# Patient Record
Sex: MALE | Race: WHITE | Employment: FULL TIME | URBAN - METROPOLITAN AREA
[De-identification: names, ages, dates, MRNs, and addresses within clinical notes are randomized per-mention and may not be internally consistent; named-entity substitution may affect disease eponyms.]

---

## 2017-03-10 RX ORDER — ATORVASTATIN CALCIUM 20 MG/1
TABLET, FILM COATED ORAL
Qty: 90 TABLET | OUTPATIENT
Start: 2017-03-10

## 2017-03-27 NOTE — TELEPHONE ENCOUNTER
Refilled denied   Patient needs to be seen. Left message stating patient needs to be seen for med check.    Last OV pertinent to medication: 10/1/15   Last refill date: 12/9/16     #/refills: 90 tabs + 0 refills   When pt was asked to return for OV: NO ment

## 2017-07-18 ENCOUNTER — OFFICE VISIT (OUTPATIENT)
Dept: INTERNAL MEDICINE CLINIC | Facility: CLINIC | Age: 52
End: 2017-07-18

## 2017-07-18 ENCOUNTER — TELEPHONE (OUTPATIENT)
Dept: INTERNAL MEDICINE CLINIC | Facility: CLINIC | Age: 52
End: 2017-07-18

## 2017-07-18 VITALS
HEIGHT: 69 IN | OXYGEN SATURATION: 98 % | DIASTOLIC BLOOD PRESSURE: 80 MMHG | SYSTOLIC BLOOD PRESSURE: 120 MMHG | BODY MASS INDEX: 28.73 KG/M2 | WEIGHT: 194 LBS | HEART RATE: 71 BPM | RESPIRATION RATE: 20 BRPM

## 2017-07-18 DIAGNOSIS — N28.1 BILATERAL RENAL CYSTS: Primary | ICD-10-CM

## 2017-07-18 DIAGNOSIS — M54.31 SCIATICA OF RIGHT SIDE: ICD-10-CM

## 2017-07-18 PROCEDURE — 99214 OFFICE O/P EST MOD 30 MIN: CPT | Performed by: INTERNAL MEDICINE

## 2017-07-18 NOTE — PROGRESS NOTES
Jad Sprain is a 54year old male.   HPI:   CC: abnormal finding on  MRI spine ordered by chiropractor 7/3/17 for radiating pain down right leg  Pt seeing chiropractor for 6 months got some relief only  Pt saw pain specialist Dr Hector Regalado recently who recomme 20   Ht 69\"   Wt 194 lb   SpO2 98%   BMI 28.65 kg/m²   GENERAL: well developed, well nourished,in no apparent distressy  CARDIO: [id number] no S3S4 no murmur,   LUNGS: clear to auscultation  GI: Soft+BS NT ND,no masses, no HSM, no abdominal bruit , no hernia

## 2017-07-18 NOTE — TELEPHONE ENCOUNTER
Called patient asking where he had his recent MRI done. Patient has an appointment with Dr Eileen Flowers at 1:20. Need to get a copy of this before his appointment.

## 2017-07-21 ENCOUNTER — HOSPITAL ENCOUNTER (OUTPATIENT)
Dept: ULTRASOUND IMAGING | Age: 52
Discharge: HOME | End: 2017-07-21
Attending: INTERNAL MEDICINE
Payer: COMMERCIAL

## 2017-07-21 DIAGNOSIS — N28.1 BILATERAL RENAL CYSTS: ICD-10-CM

## 2017-07-21 PROCEDURE — 76775 US EXAM ABDO BACK WALL LIM: CPT | Performed by: INTERNAL MEDICINE

## 2017-07-24 NOTE — PROGRESS NOTES
Spoke to pt, aware of results and recommendations. Pt voiced understanding. Gave Dr Leroy Osuna information.

## 2017-08-25 PROBLEM — N40.1 BENIGN PROSTATIC HYPERPLASIA WITH LOWER URINARY TRACT SYMPTOMS, SYMPTOM DETAILS UNSPECIFIED: Status: ACTIVE | Noted: 2017-08-25

## 2017-09-01 PROCEDURE — 36415 COLL VENOUS BLD VENIPUNCTURE: CPT | Performed by: INTERNAL MEDICINE

## 2017-09-01 PROCEDURE — 84153 ASSAY OF PSA TOTAL: CPT | Performed by: INTERNAL MEDICINE

## 2018-02-12 NOTE — TELEPHONE ENCOUNTER
Pt is leaving country and would like a 90 day supply of Atorvastatin to be sent to [facility] on file. Please advise.

## 2018-02-13 RX ORDER — ATORVASTATIN CALCIUM 20 MG/1
20 TABLET, FILM COATED ORAL
Qty: 90 TABLET | Refills: 0 | Status: SHIPPED | OUTPATIENT
Start: 2018-02-13 | End: 2018-04-12 | Stop reason: DRUGHIGH

## 2018-03-26 ENCOUNTER — TELEPHONE (OUTPATIENT)
Dept: INTERNAL MEDICINE CLINIC | Facility: CLINIC | Age: 53
End: 2018-03-26

## 2018-03-26 DIAGNOSIS — E78.5 HYPERLIPIDEMIA, UNSPECIFIED HYPERLIPIDEMIA TYPE: Primary | ICD-10-CM

## 2018-03-26 NOTE — TELEPHONE ENCOUNTER
Patient called to schedule a physical (patient was receiving a lot of phytel calls.) Patient scheduled his yearly physical for 4/12/18, patient asked if we can order blood work labs to have done before the physical. Advised to patient we don't usually orde

## 2018-03-26 NOTE — TELEPHONE ENCOUNTER
PSA, LIPID, CMP done 9/2017 - elevated total cholesterol and LDL. Pt asking for exception and having any due labs done before Physical 4/12/18. PSR advised already that Drs prefer to wait until OV. Please advise.

## 2018-03-26 NOTE — TELEPHONE ENCOUNTER
Lipid panel ordered. Spoke with pt, advised can repeat lipid since was elevated 6 months ago, fast 10-12hrs, water okay. Pt stated understanding.

## 2018-04-09 ENCOUNTER — APPOINTMENT (OUTPATIENT)
Dept: LAB | Age: 53
End: 2018-04-09
Attending: INTERNAL MEDICINE
Payer: COMMERCIAL

## 2018-04-09 DIAGNOSIS — Z00.00 ROUTINE PHYSICAL EXAMINATION: ICD-10-CM

## 2018-04-09 DIAGNOSIS — E78.5 HYPERLIPIDEMIA, UNSPECIFIED HYPERLIPIDEMIA TYPE: ICD-10-CM

## 2018-04-09 PROCEDURE — 36415 COLL VENOUS BLD VENIPUNCTURE: CPT

## 2018-04-09 PROCEDURE — 84443 ASSAY THYROID STIM HORMONE: CPT

## 2018-04-09 PROCEDURE — 80061 LIPID PANEL: CPT

## 2018-04-12 ENCOUNTER — OFFICE VISIT (OUTPATIENT)
Dept: INTERNAL MEDICINE CLINIC | Facility: CLINIC | Age: 53
End: 2018-04-12

## 2018-04-12 VITALS
OXYGEN SATURATION: 97 % | DIASTOLIC BLOOD PRESSURE: 64 MMHG | HEART RATE: 62 BPM | HEIGHT: 69 IN | RESPIRATION RATE: 12 BRPM | SYSTOLIC BLOOD PRESSURE: 100 MMHG | WEIGHT: 193 LBS | BODY MASS INDEX: 28.58 KG/M2

## 2018-04-12 DIAGNOSIS — Z00.00 ROUTINE PHYSICAL EXAMINATION: Primary | ICD-10-CM

## 2018-04-12 DIAGNOSIS — Z12.11 COLON CANCER SCREENING: ICD-10-CM

## 2018-04-12 PROCEDURE — 99396 PREV VISIT EST AGE 40-64: CPT | Performed by: INTERNAL MEDICINE

## 2018-04-12 PROCEDURE — 90471 IMMUNIZATION ADMIN: CPT | Performed by: INTERNAL MEDICINE

## 2018-04-12 PROCEDURE — 90715 TDAP VACCINE 7 YRS/> IM: CPT | Performed by: INTERNAL MEDICINE

## 2018-04-12 RX ORDER — ATORVASTATIN CALCIUM 40 MG/1
40 TABLET, FILM COATED ORAL NIGHTLY
Qty: 90 TABLET | Refills: 0 | Status: SHIPPED | OUTPATIENT
Start: 2018-04-12 | End: 2018-06-05

## 2018-04-12 NOTE — PROGRESS NOTES
Zana Baker is a 50year old male who presents for a complete physical exam.   HPI:   c/o fatigue does travel internationally and jet lagged  He had his prostate check recently    Wt Readings from Last 6 Encounters:  04/12/18 : 193 lb  07/18/17 : 194 lb denies thyroid history  ALL/ASTHMA:  +hx of allergy     EXAM:   /64 (BP Location: Left arm, Patient Position: Sitting, Cuff Size: large)   Pulse 62   Resp 12   Ht 69\"   Wt 193 lb   SpO2 97%   BMI 28.50 kg/m²   Body mass index is 28.5 kg/m².    GENERA

## 2018-06-07 RX ORDER — ATORVASTATIN CALCIUM 40 MG/1
TABLET, FILM COATED ORAL
Qty: 90 TABLET | Refills: 1 | Status: SHIPPED | OUTPATIENT
Start: 2018-06-07 | End: 2018-12-28

## 2018-07-24 ENCOUNTER — LAB ENCOUNTER (OUTPATIENT)
Dept: LAB | Age: 53
End: 2018-07-24
Attending: INTERNAL MEDICINE
Payer: COMMERCIAL

## 2018-07-24 DIAGNOSIS — Z00.00 ROUTINE PHYSICAL EXAMINATION: ICD-10-CM

## 2018-07-24 LAB
ALBUMIN SERPL-MCNC: 4 G/DL (ref 3.5–4.8)
ALBUMIN/GLOB SERPL: 1.3 {RATIO} (ref 1–2)
ALP LIVER SERPL-CCNC: 60 U/L (ref 45–117)
ALT SERPL-CCNC: 38 U/L (ref 17–63)
ANION GAP SERPL CALC-SCNC: 5 MMOL/L (ref 0–18)
AST SERPL-CCNC: 19 U/L (ref 15–41)
BASOPHILS # BLD AUTO: 0.04 X10(3) UL (ref 0–0.1)
BASOPHILS NFR BLD AUTO: 0.5 %
BILIRUB SERPL-MCNC: 0.7 MG/DL (ref 0.1–2)
BUN BLD-MCNC: 22 MG/DL (ref 8–20)
BUN/CREAT SERPL: 16.9 (ref 10–20)
CALCIUM BLD-MCNC: 9.3 MG/DL (ref 8.3–10.3)
CHLORIDE SERPL-SCNC: 107 MMOL/L (ref 101–111)
CHOLEST SMN-MCNC: 214 MG/DL (ref ?–200)
CO2 SERPL-SCNC: 30 MMOL/L (ref 22–32)
CREAT BLD-MCNC: 1.3 MG/DL (ref 0.7–1.3)
EOSINOPHIL # BLD AUTO: 0.09 X10(3) UL (ref 0–0.3)
EOSINOPHIL NFR BLD AUTO: 1.2 %
ERYTHROCYTE [DISTWIDTH] IN BLOOD BY AUTOMATED COUNT: 12.4 % (ref 11.5–16)
GLOBULIN PLAS-MCNC: 3.2 G/DL (ref 2.5–3.7)
GLUCOSE BLD-MCNC: 92 MG/DL (ref 70–99)
HCT VFR BLD AUTO: 47.3 % (ref 37–53)
HDLC SERPL-MCNC: 61 MG/DL (ref 40–59)
HGB BLD-MCNC: 15.7 G/DL (ref 13–17)
IMMATURE GRANULOCYTE COUNT: 0.03 X10(3) UL (ref 0–1)
IMMATURE GRANULOCYTE RATIO %: 0.4 %
LDLC SERPL CALC-MCNC: 124 MG/DL (ref ?–100)
LYMPHOCYTES # BLD AUTO: 1.9 X10(3) UL (ref 0.9–4)
LYMPHOCYTES NFR BLD AUTO: 24.7 %
M PROTEIN MFR SERPL ELPH: 7.2 G/DL (ref 6.1–8.3)
MCH RBC QN AUTO: 31.8 PG (ref 27–33.2)
MCHC RBC AUTO-ENTMCNC: 33.2 G/DL (ref 31–37)
MCV RBC AUTO: 95.9 FL (ref 80–99)
MONOCYTES # BLD AUTO: 0.64 X10(3) UL (ref 0.1–1)
MONOCYTES NFR BLD AUTO: 8.3 %
NEUTROPHIL ABS PRELIM: 4.98 X10 (3) UL (ref 1.3–6.7)
NEUTROPHILS # BLD AUTO: 4.98 X10(3) UL (ref 1.3–6.7)
NEUTROPHILS NFR BLD AUTO: 64.9 %
NONHDLC SERPL-MCNC: 153 MG/DL (ref ?–130)
OSMOLALITY SERPL CALC.SUM OF ELEC: 297 MOSM/KG (ref 275–295)
PLATELET # BLD AUTO: 199 10(3)UL (ref 150–450)
POTASSIUM SERPL-SCNC: 4.1 MMOL/L (ref 3.6–5.1)
RBC # BLD AUTO: 4.93 X10(6)UL (ref 4.3–5.7)
RED CELL DISTRIBUTION WIDTH-SD: 43.3 FL (ref 35.1–46.3)
SODIUM SERPL-SCNC: 142 MMOL/L (ref 136–144)
TRIGL SERPL-MCNC: 146 MG/DL (ref 30–149)
VLDLC SERPL CALC-MCNC: 29 MG/DL (ref 0–30)
WBC # BLD AUTO: 7.7 X10(3) UL (ref 4–13)

## 2018-07-24 PROCEDURE — 80053 COMPREHEN METABOLIC PANEL: CPT

## 2018-07-24 PROCEDURE — 85025 COMPLETE CBC W/AUTO DIFF WBC: CPT

## 2018-07-24 PROCEDURE — 80061 LIPID PANEL: CPT

## 2018-07-24 PROCEDURE — 36415 COLL VENOUS BLD VENIPUNCTURE: CPT

## 2018-10-30 ENCOUNTER — HOSPITAL ENCOUNTER (OUTPATIENT)
Age: 53
Discharge: HOME | End: 2018-10-30
Payer: COMMERCIAL

## 2018-10-30 VITALS
HEART RATE: 62 BPM | SYSTOLIC BLOOD PRESSURE: 126 MMHG | RESPIRATION RATE: 18 BRPM | OXYGEN SATURATION: 97 % | DIASTOLIC BLOOD PRESSURE: 84 MMHG | TEMPERATURE: 98 F

## 2018-10-30 DIAGNOSIS — J01.40 ACUTE PANSINUSITIS, RECURRENCE NOT SPECIFIED: ICD-10-CM

## 2018-10-30 DIAGNOSIS — J40 BRONCHITIS: Primary | ICD-10-CM

## 2018-10-30 PROCEDURE — 99214 OFFICE O/P EST MOD 30 MIN: CPT

## 2018-10-30 PROCEDURE — 94640 AIRWAY INHALATION TREATMENT: CPT

## 2018-10-30 RX ORDER — ALBUTEROL SULFATE 90 UG/1
2 AEROSOL, METERED RESPIRATORY (INHALATION) EVERY 4 HOURS PRN
Qty: 1 INHALER | Refills: 0 | Status: SHIPPED | OUTPATIENT
Start: 2018-10-30 | End: 2018-11-29

## 2018-10-30 RX ORDER — PREDNISONE 20 MG/1
40 TABLET ORAL DAILY
Qty: 8 TABLET | Refills: 0 | Status: SHIPPED | OUTPATIENT
Start: 2018-10-30 | End: 2018-11-03

## 2018-10-30 RX ORDER — DOXYCYCLINE HYCLATE 100 MG
100 TABLET ORAL 2 TIMES DAILY
Qty: 14 TABLET | Refills: 0 | Status: SHIPPED | OUTPATIENT
Start: 2018-10-30 | End: 2018-11-06

## 2018-10-30 RX ORDER — PREDNISONE 20 MG/1
60 TABLET ORAL ONCE
Status: COMPLETED | OUTPATIENT
Start: 2018-10-30 | End: 2018-10-30

## 2018-10-30 RX ORDER — MOMETASONE 50 UG/1
SPRAY, METERED NASAL DAILY
COMMUNITY
End: 2019-05-16 | Stop reason: ALTCHOICE

## 2018-10-30 RX ORDER — IPRATROPIUM BROMIDE AND ALBUTEROL SULFATE 2.5; .5 MG/3ML; MG/3ML
3 SOLUTION RESPIRATORY (INHALATION) ONCE
Status: COMPLETED | OUTPATIENT
Start: 2018-10-30 | End: 2018-10-30

## 2018-10-30 NOTE — ED INITIAL ASSESSMENT (HPI)
Patient presents with 5-6 day h/o cough productive of yellow sputum. No fever noted. +Sinus drainage. Wife also sick

## 2018-10-30 NOTE — ED PROVIDER NOTES
Patient Seen in: [facility]    History   Patient presents with:  Cough/URI    Stated Complaint: cough congestion     HPI    49 yo male  here  with complaint of a 5-day history of a nonproductive cough making it difficult to catc (36.7 °C) (Oral)   Resp 18   SpO2 97%         Physical Exam   Constitutional: He is oriented to person, place, and time. He appears well-developed and well-nourished. HENT:   Head: Normocephalic.    Right Ear: External ear and ear canal normal. Tympanic m 5:15 pm    Follow-up:  Billie Andrea DO  [address] [facility] [address] [phone]    Schedule an appointment as soon as possible for a visit           Medications Prescribed:  Current Discharge Medication List    START taking these

## 2018-12-28 RX ORDER — ATORVASTATIN CALCIUM 40 MG/1
TABLET, FILM COATED ORAL
Qty: 90 TABLET | Refills: 1 | OUTPATIENT
Start: 2018-12-28

## 2018-12-28 RX ORDER — ATORVASTATIN CALCIUM 40 MG/1
TABLET, FILM COATED ORAL
Qty: 30 TABLET | Refills: 0 | Status: SHIPPED | OUTPATIENT
Start: 2018-12-28 | End: 2019-03-04

## 2018-12-28 NOTE — TELEPHONE ENCOUNTER
Last OV relevant to medication: 4/12/18 PE  Last refill date: 6/7/18     #/refills: 90/1  When pt was asked to return for OV: none  Upcoming appt/reason: none    Left a message for pt stating he needs an OV for a full refill and a temp will be sent to phar

## 2019-03-04 RX ORDER — ATORVASTATIN CALCIUM 40 MG/1
TABLET, FILM COATED ORAL
Qty: 90 TABLET | Refills: 0 | Status: SHIPPED | OUTPATIENT
Start: 2019-03-04 | End: 2019-05-16

## 2019-03-04 NOTE — TELEPHONE ENCOUNTER
Was patient advised to contact pharmacy directly?:  Yes - pharmacy change    Is patient out of meds or supply very low?:  out    Medication Requested:  atorvastatin Oral Tab    Dose:  40 MG    Is patient requesting a 30 or 90 day supply?:  Enough until pat

## 2019-05-16 ENCOUNTER — OFFICE VISIT (OUTPATIENT)
Dept: INTERNAL MEDICINE CLINIC | Facility: CLINIC | Age: 54
End: 2019-05-16
Payer: COMMERCIAL

## 2019-05-16 VITALS
RESPIRATION RATE: 16 BRPM | TEMPERATURE: 98 F | SYSTOLIC BLOOD PRESSURE: 108 MMHG | HEART RATE: 57 BPM | BODY MASS INDEX: 28.76 KG/M2 | HEIGHT: 69 IN | WEIGHT: 194.19 LBS | DIASTOLIC BLOOD PRESSURE: 70 MMHG | OXYGEN SATURATION: 95 %

## 2019-05-16 DIAGNOSIS — Z12.11 SCREENING FOR COLON CANCER: ICD-10-CM

## 2019-05-16 DIAGNOSIS — Z00.00 ROUTINE PHYSICAL EXAMINATION: Primary | ICD-10-CM

## 2019-05-16 PROCEDURE — 99396 PREV VISIT EST AGE 40-64: CPT | Performed by: NURSE PRACTITIONER

## 2019-05-16 RX ORDER — ATORVASTATIN CALCIUM 40 MG/1
TABLET, FILM COATED ORAL
Qty: 90 TABLET | Refills: 1 | Status: SHIPPED | OUTPATIENT
Start: 2019-05-16 | End: 2019-12-04

## 2019-05-16 NOTE — PROGRESS NOTES
Chely Urena is a 48year old male who presents for a complete physical exam.     HPI:   Pt has no acute complaints. Feeling well. Exercises regularly, tries to eat healthy. Is over due for colonoscopy.  Stated he is willing to do it, just hasn't \"gotten 2001    inguinal   • REPAIR ROTATOR CUFF,ACUTE  2006    left      Family History   Problem Relation Age of Onset   • Other (cerebral aneurysm) Mother 34   • Heart Disorder Maternal Grandfather    • Heart Disorder Paternal Grandfather    • Other (cad) Gannon ideations. EXAM:   /70 (BP Location: Left arm, Patient Position: Sitting, Cuff Size: adult)   Pulse 57   Temp 97.7 °F (36.5 °C) (Oral)   Resp 16   Ht 69\"   Wt 194 lb 3.2 oz   SpO2 95%   BMI 28.68 kg/m²   Body mass index is 28.68 kg/m².    GENERAL: [E]      Lipid Panel [E]      TSH W Reflex To Free T4 [E]      PSA (Screening) [E]      Meds & Refills for this Visit:  Requested Prescriptions     Signed Prescriptions Disp Refills   • atorvastatin 40 MG Oral Tab 90 tablet 1     Sig: TAKE 1 TABLET BY IVON

## 2019-05-17 ENCOUNTER — LAB ENCOUNTER (OUTPATIENT)
Dept: LAB | Age: 54
End: 2019-05-17
Attending: NURSE PRACTITIONER
Payer: COMMERCIAL

## 2019-05-17 DIAGNOSIS — Z00.00 ROUTINE PHYSICAL EXAMINATION: ICD-10-CM

## 2019-05-17 PROCEDURE — 36415 COLL VENOUS BLD VENIPUNCTURE: CPT | Performed by: NURSE PRACTITIONER

## 2019-05-17 PROCEDURE — 80050 GENERAL HEALTH PANEL: CPT | Performed by: NURSE PRACTITIONER

## 2019-05-17 PROCEDURE — 80061 LIPID PANEL: CPT | Performed by: NURSE PRACTITIONER

## 2019-05-17 PROCEDURE — 84153 ASSAY OF PSA TOTAL: CPT | Performed by: NURSE PRACTITIONER

## 2019-05-20 NOTE — PROGRESS NOTES
Sent pt [employer] message. Made aware of results & recommendations. Advised to call back for any questions/concerns.

## 2019-11-10 ENCOUNTER — HOSPITAL ENCOUNTER (OUTPATIENT)
Age: 54
Discharge: HOME | End: 2019-11-10
Attending: EMERGENCY MEDICINE
Payer: COMMERCIAL

## 2019-11-10 VITALS
TEMPERATURE: 98 F | HEART RATE: 66 BPM | SYSTOLIC BLOOD PRESSURE: 138 MMHG | HEIGHT: 69 IN | DIASTOLIC BLOOD PRESSURE: 82 MMHG | RESPIRATION RATE: 20 BRPM | OXYGEN SATURATION: 97 % | WEIGHT: 185 LBS | BODY MASS INDEX: 27.4 KG/M2

## 2019-11-10 DIAGNOSIS — B02.9 HERPES ZOSTER WITHOUT COMPLICATION: Primary | ICD-10-CM

## 2019-11-10 PROCEDURE — 99214 OFFICE O/P EST MOD 30 MIN: CPT

## 2019-11-10 PROCEDURE — 99213 OFFICE O/P EST LOW 20 MIN: CPT

## 2019-11-10 RX ORDER — VALACYCLOVIR HYDROCHLORIDE 1 G/1
1 TABLET, FILM COATED ORAL 3 TIMES DAILY
Qty: 21 TABLET | Refills: 0 | Status: SHIPPED | OUTPATIENT
Start: 2019-11-10 | End: 2019-11-17

## 2019-11-10 RX ORDER — HYDROCODONE BITARTRATE AND ACETAMINOPHEN 5; 325 MG/1; MG/1
1-2 TABLET ORAL EVERY 6 HOURS PRN
Qty: 10 TABLET | Refills: 0 | Status: SHIPPED | OUTPATIENT
Start: 2019-11-10 | End: 2019-11-17

## 2019-11-10 NOTE — ED INITIAL ASSESSMENT (HPI)
Patient presents with  Red rash vesicular right sided back,right chest,under right arm x 2 days. No drainage noted. Noted back pain 4-5 days prior to breakout.

## 2019-11-10 NOTE — ED PROVIDER NOTES
Patient Seen in: [facility]      History   Patient presents with:  Rash Skin Problem (integumentary)    Stated Complaint: Body rash    HPI    44-year-old male complaint of rash the patient states he has had some pain in his righ with vesicles from the right mid to upper back about the level of T6 that wraps around to the anterior aspect of the chest on the right side this does not cross the midline this seems consistent with shingles.     ED Course   Labs Reviewed - No data to disp

## 2019-12-05 RX ORDER — ATORVASTATIN CALCIUM 40 MG/1
TABLET, FILM COATED ORAL
Qty: 90 TABLET | Refills: 1 | Status: SHIPPED | OUTPATIENT
Start: 2019-12-05 | End: 2020-06-03

## 2019-12-23 ENCOUNTER — HOSPITAL ENCOUNTER (OUTPATIENT)
Age: 54
Discharge: HOME | End: 2019-12-23
Payer: COMMERCIAL

## 2019-12-23 VITALS
HEART RATE: 65 BPM | BODY MASS INDEX: 26.66 KG/M2 | HEIGHT: 69 IN | RESPIRATION RATE: 18 BRPM | SYSTOLIC BLOOD PRESSURE: 148 MMHG | WEIGHT: 180 LBS | OXYGEN SATURATION: 96 % | TEMPERATURE: 97 F | DIASTOLIC BLOOD PRESSURE: 89 MMHG

## 2019-12-23 DIAGNOSIS — J01.10 ACUTE NON-RECURRENT FRONTAL SINUSITIS: Primary | ICD-10-CM

## 2019-12-23 PROCEDURE — 99213 OFFICE O/P EST LOW 20 MIN: CPT

## 2019-12-23 PROCEDURE — 99214 OFFICE O/P EST MOD 30 MIN: CPT

## 2019-12-23 RX ORDER — BENZONATATE 100 MG/1
100 CAPSULE ORAL 3 TIMES DAILY PRN
Qty: 30 CAPSULE | Refills: 0 | Status: SHIPPED | OUTPATIENT
Start: 2019-12-23 | End: 2020-01-22

## 2019-12-23 RX ORDER — AMOXICILLIN AND CLAVULANATE POTASSIUM 875; 125 MG/1; MG/1
1 TABLET, FILM COATED ORAL 2 TIMES DAILY
Qty: 20 TABLET | Refills: 0 | Status: SHIPPED | OUTPATIENT
Start: 2019-12-23 | End: 2020-01-02

## 2019-12-23 NOTE — ED PROVIDER NOTES
Patient Seen in: [facility]      History   No chief complaint on file.     Stated Complaint: sinus issues x 7 days    HPI  51-year-old male who does not smoke or vape presents with nasal congestion accompanied by sinus pain with Exam  Vitals signs and nursing note reviewed. Constitutional:       General: He is not in acute distress. Appearance: Normal appearance. He is well-developed. He is not ill-appearing or toxic-appearing.    HENT:      Head: Normocephalic and atraumatic and no resp distress. Pt will be given abx and tessalon perles with f/u as needed.               Disposition and Plan     Clinical Impression:  Acute non-recurrent frontal sinusitis  (primary encounter diagnosis)    Disposition:  Discharge  12/23/2019  8:2

## 2020-01-23 ENCOUNTER — HOSPITAL ENCOUNTER (OUTPATIENT)
Age: 55
Discharge: STILL ADMITTED OR ADMINISTRATIVE | End: 2020-01-23
Payer: COMMERCIAL

## 2020-06-02 DIAGNOSIS — E78.5 HYPERLIPIDEMIA, UNSPECIFIED HYPERLIPIDEMIA TYPE: Primary | ICD-10-CM

## 2020-06-02 NOTE — TELEPHONE ENCOUNTER
LM for patient to call back to schedule OV - Physical that's Overdue. Patient also needs to Establish Care with one of the Providers in our office as he has only seen Bell Castillo NP and Dr. Alethea Cha.   Stated that the appointment needs to be made in system to r

## 2020-06-02 NOTE — TELEPHONE ENCOUNTER
Please let the patient know he is due for a physical and have him schedule an apt. Then script will be refilled. Thanks.

## 2020-06-03 RX ORDER — ATORVASTATIN CALCIUM 40 MG/1
TABLET, FILM COATED ORAL
Qty: 90 TABLET | Refills: 0 | OUTPATIENT
Start: 2020-06-03

## 2020-06-03 RX ORDER — ATORVASTATIN CALCIUM 40 MG/1
TABLET, FILM COATED ORAL
Qty: 30 TABLET | Refills: 1 | Status: SHIPPED | OUTPATIENT
Start: 2020-06-03 | End: 2020-07-15

## 2020-06-03 NOTE — TELEPHONE ENCOUNTER
Patient returned call, patient scheduled a physical with DR. Adelita Coburn on 7/15/2020, since patient is out of medication can we please fill him till the upcoming appointment? PSR scheduled first available that worked for him/Dr. Quinonez Roxy schedule.     Lori

## 2020-07-15 ENCOUNTER — OFFICE VISIT (OUTPATIENT)
Dept: INTERNAL MEDICINE CLINIC | Facility: CLINIC | Age: 55
End: 2020-07-15
Payer: COMMERCIAL

## 2020-07-15 VITALS
BODY MASS INDEX: 28.38 KG/M2 | TEMPERATURE: 97 F | RESPIRATION RATE: 18 BRPM | HEIGHT: 69 IN | DIASTOLIC BLOOD PRESSURE: 76 MMHG | HEART RATE: 57 BPM | WEIGHT: 191.63 LBS | OXYGEN SATURATION: 99 % | SYSTOLIC BLOOD PRESSURE: 138 MMHG

## 2020-07-15 DIAGNOSIS — E78.5 HYPERLIPIDEMIA, UNSPECIFIED HYPERLIPIDEMIA TYPE: ICD-10-CM

## 2020-07-15 DIAGNOSIS — Z00.00 ENCOUNTER FOR ANNUAL PHYSICAL EXAM: Primary | ICD-10-CM

## 2020-07-15 DIAGNOSIS — Z13.228 SCREENING FOR METABOLIC DISORDER: ICD-10-CM

## 2020-07-15 DIAGNOSIS — Z12.11 COLON CANCER SCREENING: ICD-10-CM

## 2020-07-15 DIAGNOSIS — Z13.0 SCREENING FOR IRON DEFICIENCY ANEMIA: ICD-10-CM

## 2020-07-15 DIAGNOSIS — Z13.1 SCREENING FOR DIABETES MELLITUS: ICD-10-CM

## 2020-07-15 DIAGNOSIS — Z13.29 SCREENING FOR THYROID DISORDER: ICD-10-CM

## 2020-07-15 PROCEDURE — 99396 PREV VISIT EST AGE 40-64: CPT | Performed by: STUDENT IN AN ORGANIZED HEALTH CARE EDUCATION/TRAINING PROGRAM

## 2020-07-15 RX ORDER — ATORVASTATIN CALCIUM 40 MG/1
TABLET, FILM COATED ORAL
Qty: 90 TABLET | Refills: 0 | Status: SHIPPED | OUTPATIENT
Start: 2020-07-15 | End: 2021-01-19

## 2020-07-15 NOTE — PROGRESS NOTES
[facility]    REASON FOR VISIT:    Panda Olivas is a 48year old male who presents for an [address]. Current Complaints: feeling well. Reports compliance with the medications, denies any side effects  Vaccinations: Tdap 2018. PPDINDURAT      ALLERGIES:   No Known Allergies    CURRENT MEDICATIONS:   Current Outpatient Medications   Medication Sig Dispense Refill   • atorvastatin 40 MG Oral Tab TAKE 1 TABLET NIGHTLY 90 tablet 0   • Multiple Vitamins-Minerals (MENS MULTIVITAMIN PL weakness and numbness. Hematological: Negative for adenopathy. Does not bruise/bleed easily. Psychiatric/Behavioral: Negative for confusion and agitation. The patient is not nervous/anxious.       EXAM:   /76 (BP Location: Left arm, Patient Positi Skin: Skin is warm. No rash noted. No erythema. Psychiatric: He has a normal mood and affect.  His behavior is normal.     ASSESSMENT AND PLAN WITH OTHER RELEVANT CHRONIC CONDITIONS:   Fab Velazquez is a 48year old male who presents for an Annual Heal [E]      Hemoglobin A1C [E]      Lipid Panel [E]      TSH W Reflex To Free T4 [E]      Imaging & Consults:  GASTRO - INTERNAL        His 5 year prevention plan includes: annual visits for fasting labs, screening colonoscopy.    Patient/Caregiver Education:

## 2020-07-15 NOTE — PATIENT INSTRUCTIONS
Prevention Guidelines, Men Ages 48 to 59  Screening tests and vaccines are an important part of managing your health. A screening test is done to find possible disorders or diseases in people who don't have any symptoms.  The goal is to find a disease ear High cholesterol or triglycerides All men in this age group At least every 5 years   HIV Men at increased risk for infection – talk with your healthcare provider At routine exams   Lung cancer Adults age 54 to [de-identified] who have smoked Yearly screening in smokers Pneumococcal conjugate vaccine (PCV13) and pneumococcal polysaccharide vaccine (PPSV23) Men at increased risk for infection – talk with your healthcare provider PCV13: 1 dose ages 23 to 72 (protects against 13 types of pneumococcal bacteria)     PPSV23: 1 Screening tests and vaccines are an important part of managing your health. A screening test is done to find possible disorders or diseases in people who don't have any symptoms.  The goal is to find a disease early so lifestyle changes can be made and you

## 2020-07-22 ENCOUNTER — LAB ENCOUNTER (OUTPATIENT)
Dept: LAB | Age: 55
End: 2020-07-22
Attending: STUDENT IN AN ORGANIZED HEALTH CARE EDUCATION/TRAINING PROGRAM
Payer: COMMERCIAL

## 2020-07-22 DIAGNOSIS — Z13.1 SCREENING FOR DIABETES MELLITUS: ICD-10-CM

## 2020-07-22 DIAGNOSIS — Z13.0 SCREENING FOR IRON DEFICIENCY ANEMIA: ICD-10-CM

## 2020-07-22 DIAGNOSIS — E78.5 HYPERLIPIDEMIA, UNSPECIFIED HYPERLIPIDEMIA TYPE: ICD-10-CM

## 2020-07-22 DIAGNOSIS — Z13.228 SCREENING FOR METABOLIC DISORDER: ICD-10-CM

## 2020-07-22 DIAGNOSIS — Z13.29 SCREENING FOR THYROID DISORDER: ICD-10-CM

## 2020-07-22 LAB
ALBUMIN SERPL-MCNC: 4 G/DL (ref 3.4–5)
ALBUMIN/GLOB SERPL: 1.2 {RATIO} (ref 1–2)
ALP LIVER SERPL-CCNC: 52 U/L (ref 45–117)
ALT SERPL-CCNC: 37 U/L (ref 16–61)
ANION GAP SERPL CALC-SCNC: 4 MMOL/L (ref 0–18)
AST SERPL-CCNC: 17 U/L (ref 15–37)
BASOPHILS # BLD AUTO: 0.04 X10(3) UL (ref 0–0.2)
BASOPHILS NFR BLD AUTO: 0.6 %
BILIRUB SERPL-MCNC: 0.8 MG/DL (ref 0.1–2)
BUN BLD-MCNC: 17 MG/DL (ref 7–18)
BUN/CREAT SERPL: 13.7 (ref 10–20)
CALCIUM BLD-MCNC: 9.3 MG/DL (ref 8.5–10.1)
CHLORIDE SERPL-SCNC: 104 MMOL/L (ref 98–112)
CHOLEST SMN-MCNC: 197 MG/DL (ref ?–200)
CO2 SERPL-SCNC: 29 MMOL/L (ref 21–32)
CREAT BLD-MCNC: 1.24 MG/DL (ref 0.7–1.3)
DEPRECATED RDW RBC AUTO: 40.8 FL (ref 35.1–46.3)
EOSINOPHIL # BLD AUTO: 0.11 X10(3) UL (ref 0–0.7)
EOSINOPHIL NFR BLD AUTO: 1.7 %
ERYTHROCYTE [DISTWIDTH] IN BLOOD BY AUTOMATED COUNT: 12 % (ref 11–15)
EST. AVERAGE GLUCOSE BLD GHB EST-MCNC: 97 MG/DL (ref 68–126)
GLOBULIN PLAS-MCNC: 3.3 G/DL (ref 2.8–4.4)
GLUCOSE BLD-MCNC: 103 MG/DL (ref 70–99)
HBA1C MFR BLD HPLC: 5 % (ref ?–5.7)
HCT VFR BLD AUTO: 49.9 % (ref 39–53)
HDLC SERPL-MCNC: 55 MG/DL (ref 40–59)
HGB BLD-MCNC: 16.9 G/DL (ref 13–17.5)
IMM GRANULOCYTES # BLD AUTO: 0.04 X10(3) UL (ref 0–1)
IMM GRANULOCYTES NFR BLD: 0.6 %
LDLC SERPL CALC-MCNC: 123 MG/DL (ref ?–100)
LYMPHOCYTES # BLD AUTO: 1.44 X10(3) UL (ref 1–4)
LYMPHOCYTES NFR BLD AUTO: 21.9 %
M PROTEIN MFR SERPL ELPH: 7.3 G/DL (ref 6.4–8.2)
MCH RBC QN AUTO: 31.2 PG (ref 26–34)
MCHC RBC AUTO-ENTMCNC: 33.9 G/DL (ref 31–37)
MCV RBC AUTO: 92.1 FL (ref 80–100)
MONOCYTES # BLD AUTO: 0.47 X10(3) UL (ref 0.1–1)
MONOCYTES NFR BLD AUTO: 7.2 %
NEUTROPHILS # BLD AUTO: 4.47 X10 (3) UL (ref 1.5–7.7)
NEUTROPHILS # BLD AUTO: 4.47 X10(3) UL (ref 1.5–7.7)
NEUTROPHILS NFR BLD AUTO: 68 %
NONHDLC SERPL-MCNC: 142 MG/DL (ref ?–130)
OSMOLALITY SERPL CALC.SUM OF ELEC: 286 MOSM/KG (ref 275–295)
PATIENT FASTING Y/N/NP: YES
PATIENT FASTING Y/N/NP: YES
PLATELET # BLD AUTO: 206 10(3)UL (ref 150–450)
POTASSIUM SERPL-SCNC: 4.1 MMOL/L (ref 3.5–5.1)
RBC # BLD AUTO: 5.42 X10(6)UL (ref 4.3–5.7)
SODIUM SERPL-SCNC: 137 MMOL/L (ref 136–145)
TRIGL SERPL-MCNC: 96 MG/DL (ref 30–149)
TSI SER-ACNC: 1.19 MIU/ML (ref 0.36–3.74)
VLDLC SERPL CALC-MCNC: 19 MG/DL (ref 0–30)
WBC # BLD AUTO: 6.6 X10(3) UL (ref 4–11)

## 2020-07-22 PROCEDURE — 36415 COLL VENOUS BLD VENIPUNCTURE: CPT | Performed by: STUDENT IN AN ORGANIZED HEALTH CARE EDUCATION/TRAINING PROGRAM

## 2020-07-22 PROCEDURE — 80061 LIPID PANEL: CPT | Performed by: STUDENT IN AN ORGANIZED HEALTH CARE EDUCATION/TRAINING PROGRAM

## 2020-07-22 PROCEDURE — 80050 GENERAL HEALTH PANEL: CPT | Performed by: STUDENT IN AN ORGANIZED HEALTH CARE EDUCATION/TRAINING PROGRAM

## 2020-07-22 PROCEDURE — 83036 HEMOGLOBIN GLYCOSYLATED A1C: CPT | Performed by: STUDENT IN AN ORGANIZED HEALTH CARE EDUCATION/TRAINING PROGRAM

## 2020-08-14 ENCOUNTER — TELEMEDICINE (OUTPATIENT)
Dept: INTERNAL MEDICINE CLINIC | Facility: CLINIC | Age: 55
End: 2020-08-14
Payer: COMMERCIAL

## 2020-08-14 ENCOUNTER — TELEPHONE (OUTPATIENT)
Dept: INTERNAL MEDICINE CLINIC | Facility: CLINIC | Age: 55
End: 2020-08-14

## 2020-08-14 DIAGNOSIS — Z20.822 EXPOSURE TO COVID-19 VIRUS: Primary | ICD-10-CM

## 2020-08-14 DIAGNOSIS — R19.7 DIARRHEA, UNSPECIFIED TYPE: ICD-10-CM

## 2020-08-14 PROCEDURE — 99213 OFFICE O/P EST LOW 20 MIN: CPT | Performed by: NURSE PRACTITIONER

## 2020-08-14 NOTE — TELEPHONE ENCOUNTER
[facility] Video Visit Consent    Mary Arndt verbally {consents to a [facility] Video Visit Check-In service on 8/14/2020. Patient understands that we are using a different platform that may not be as secure as our traditional telehealth platform.  Patient

## 2020-08-14 NOTE — PATIENT INSTRUCTIONS
Get your COVID test    Infection prevention  - proper hand hygiene is important               - cough into your arm or a tissue  - Avoid touching your mouth, nose, eyes, and face.   - Avoid contact with others if you have symptoms of an upper or lower URI You have a viral upper respiratory illness (URI), which is another term for the common cold. This illness is contagious during the first few days. It is spread through the air by coughing and sneezing.  It may also be spread by direct contact (touching the · Over-the-counter cold medicines will not shorten the length of time you’re sick, but they may be helpful for the following symptoms: cough, sore throat, and nasal and sinus congestion.  If you take prescription medicines, ask your healthcare provider or p

## 2020-08-14 NOTE — PROGRESS NOTES
Virtual video [address] [facility] [address] verbally consents to a [employer] visit on 08/14/20. Patient has been referred to the [facility] website at [email or web address] to review the yearly Consent to Treat document.     Patient understands and phonate clearly without pausing due to sob, there was no coughing during the visit.   NEURO: Oriented times three      LABS:      Lab Results   Component Value Date    WBC 6.6 07/22/2020    RBC 5.42 07/22/2020    HGB 16.9 07/22/2020    HCT 49.9 07/22/2020 Avoid direct contact with your pets if you are ill  - Disinfect surfaces with appropriate materials  - Monitor symptoms, and call if they become severe (shortness of breath with rest or activity, fever, chest congestion, productive cough)  Advised to monit

## 2020-08-15 ENCOUNTER — LAB ENCOUNTER (OUTPATIENT)
Dept: LAB | Facility: HOSPITAL | Age: 55
End: 2020-08-15
Attending: NURSE PRACTITIONER
Payer: COMMERCIAL

## 2020-08-15 DIAGNOSIS — Z20.822 EXPOSURE TO COVID-19 VIRUS: Primary | ICD-10-CM

## 2020-08-19 LAB — SARS-COV-2 BY PCR: NOT DETECTED

## 2020-09-13 ENCOUNTER — APPOINTMENT (OUTPATIENT)
Dept: LAB | Facility: HOSPITAL | Age: 55
End: 2020-09-13
Attending: INTERNAL MEDICINE
Payer: COMMERCIAL

## 2020-09-13 DIAGNOSIS — Z01.818 PRE-OP TESTING: ICD-10-CM

## 2020-09-15 LAB — SARS-COV-2 RNA RESP QL NAA+PROBE: NOT DETECTED

## 2020-09-16 PROBLEM — K64.8 INTERNAL HEMORRHOIDS: Status: ACTIVE | Noted: 2020-09-16

## 2020-09-16 PROBLEM — Z12.11 SPECIAL SCREENING FOR MALIGNANT NEOPLASMS, COLON: Status: ACTIVE | Noted: 2020-09-16

## 2020-09-16 PROBLEM — K63.5 COLON POLYPS: Status: ACTIVE | Noted: 2020-09-16

## 2020-09-24 LAB
AMB EXT CMP ALT: 37 U/L
AMB EXT CMP AST: 23 U/L
AMB EXT CREATININE: 1.2 MG/DL
AMB EXT GLUCOSE: 82 MG/DL
AMB EXT HEMATOCRIT: 49.7
AMB EXT HEMOGLOBIN: 17.3
AMB EXT MCV: 92
AMB EXT PLATELETS: 221
AMB EXT POSTASSIUM: 3.9 MMOL/L
AMB EXT SODIUM: 138 MMOL/L
AMB EXT WBC: 7.9 X10(3)UL

## 2020-11-20 ENCOUNTER — HOSPITAL ENCOUNTER (OUTPATIENT)
Age: 55
Discharge: HOME | End: 2020-11-20
Payer: COMMERCIAL

## 2020-11-20 VITALS
TEMPERATURE: 99 F | DIASTOLIC BLOOD PRESSURE: 69 MMHG | HEIGHT: 69 IN | RESPIRATION RATE: 20 BRPM | SYSTOLIC BLOOD PRESSURE: 139 MMHG | BODY MASS INDEX: 27.4 KG/M2 | OXYGEN SATURATION: 97 % | WEIGHT: 185 LBS | HEART RATE: 72 BPM

## 2020-11-20 DIAGNOSIS — Z20.822 SUSPECTED COVID-19 VIRUS INFECTION: ICD-10-CM

## 2020-11-20 DIAGNOSIS — R43.2 LOSS OF TASTE: ICD-10-CM

## 2020-11-20 DIAGNOSIS — B34.9 VIRAL SYNDROME: Primary | ICD-10-CM

## 2020-11-20 PROCEDURE — 99213 OFFICE O/P EST LOW 20 MIN: CPT

## 2020-11-20 NOTE — ED PROVIDER NOTES
Patient Seen in: [facility]      History   Patient presents with:  Loss Of Smell Or Taste  Cough    Stated Complaint: COVID TESTING    HPI    63-year-old male who comes in today concerned for COVID-19.   He states yesterday he started havi obvious abnormality   Eyes:  conjunctiva and cornea clear, both eyes   Ears: TMs bilaterally pearly gray with no evidence of erythema bulging good light reflex  Throat: Lips, tongue, and mucosa are moist, pink, and intact; teeth intact.  No erythema, no exu

## 2021-01-19 DIAGNOSIS — E78.5 HYPERLIPIDEMIA, UNSPECIFIED HYPERLIPIDEMIA TYPE: ICD-10-CM

## 2021-01-19 RX ORDER — ATORVASTATIN CALCIUM 40 MG/1
TABLET, FILM COATED ORAL
Qty: 90 TABLET | Refills: 0 | Status: SHIPPED | OUTPATIENT
Start: 2021-01-19 | End: 2021-04-12

## 2021-01-19 NOTE — TELEPHONE ENCOUNTER
Did the patient contact the pharmacy directly?:  No - new pharmacy    Is patient out of meds or supply very low?:  out    Medication Requested:  atorvastatin 40 MG Oral Tab    Dose:  40 MG    Is patient requesting a 30 or 90 day supply?:  90    Pharmacy na

## 2021-03-06 ENCOUNTER — HOSPITAL ENCOUNTER (OUTPATIENT)
Dept: MRI IMAGING | Age: 56
Discharge: HOME | End: 2021-03-06
Attending: ORTHOPAEDIC SURGERY
Payer: COMMERCIAL

## 2021-03-06 DIAGNOSIS — IMO0002 DISORDER OF ROTATOR CUFF SYNDROME OF RIGHT SHOULDER AND ALLIED DISORDER: ICD-10-CM

## 2021-03-06 PROCEDURE — 73221 MRI JOINT UPR EXTREM W/O DYE: CPT | Performed by: ORTHOPAEDIC SURGERY

## 2021-03-17 LAB
AMB EXT CMP ALT: 27 U/L
AMB EXT CMP AST: 20 U/L
AMB EXT CREATININE: 1.18 MG/DL
AMB EXT GFR: 70
AMB EXT GLUCOSE: 111 MG/DL
AMB EXT HEMATOCRIT: 49.4
AMB EXT HEMOGLOBIN: 16.9
AMB EXT MCV: 91
AMB EXT PLATELETS: 225
AMB EXT POSTASSIUM: 4.3 MMOL/L
AMB EXT PSA SCREEN: 1.1 NG/ML
AMB EXT SODIUM: 140 MMOL/L
AMB EXT WBC: 5.6 X10(3)UL

## 2021-04-12 DIAGNOSIS — E78.5 HYPERLIPIDEMIA, UNSPECIFIED HYPERLIPIDEMIA TYPE: ICD-10-CM

## 2021-04-12 RX ORDER — ATORVASTATIN CALCIUM 40 MG/1
TABLET, FILM COATED ORAL
Qty: 90 TABLET | Refills: 0 | Status: SHIPPED | OUTPATIENT
Start: 2021-04-12 | End: 2021-06-07

## 2021-04-12 NOTE — TELEPHONE ENCOUNTER
Last refill #90 on 1/19/2021  Last office visit pertaining to refill on 7/15/2020 - cpx  Future Appointments   Date Time Provider Leena Hawkins   6/16/2021  6:30 AM Suleman Chadwick MD [facility]     Next office visit due on or around 7/15/20

## 2021-06-07 DIAGNOSIS — E78.5 HYPERLIPIDEMIA, UNSPECIFIED HYPERLIPIDEMIA TYPE: ICD-10-CM

## 2021-06-07 RX ORDER — ATORVASTATIN CALCIUM 40 MG/1
TABLET, FILM COATED ORAL
Qty: 90 TABLET | Refills: 0 | Status: SHIPPED | OUTPATIENT
Start: 2021-06-07 | End: 2021-08-20

## 2021-06-07 NOTE — TELEPHONE ENCOUNTER
Passes protocol  90 days supply given but needs to make appt for annual PE with Zully in July or med check in June with Dr. Katelyn Talbert    PSR-Please call pt to schedule appt.  thanks

## 2021-06-07 NOTE — TELEPHONE ENCOUNTER
Did the patient contact the pharmacy directly?:  No     Is patient out of meds or supply very low?:  Out     Medication Requested:  atorvastatin 40 MG Oral Tab    Dose:      Is patient requesting a 30 or 90 day supply?:  90    Pharmacy name and phone # or

## 2021-07-16 LAB
AMB EXT CHOLESTEROL, TOTAL: 221 MG/DL
AMB EXT CMP ALT: 28 U/L
AMB EXT CMP AST: 17 U/L
AMB EXT CREATININE: 1.16 MG/DL
AMB EXT GFR: 71
AMB EXT HDL CHOLESTEROL: 50 MG/DL
AMB EXT HEMATOCRIT: 47.3
AMB EXT HEMOGLOBIN: 16.2
AMB EXT HGBA1C: 96 %
AMB EXT MCV: 92
AMB EXT PLATELETS: 188
AMB EXT POSTASSIUM: 4.4 MMOL/L
AMB EXT SODIUM: 139 MMOL/L
AMB EXT TRIGLYCERIDES: 123 MG/DL
AMB EXT VLDL: 22 MG/DL
AMB EXT WBC: 5.8 X10(3)UL

## 2021-07-23 ENCOUNTER — TELEPHONE (OUTPATIENT)
Dept: INTERNAL MEDICINE CLINIC | Facility: CLINIC | Age: 56
End: 2021-07-23

## 2021-07-23 ENCOUNTER — OFFICE VISIT (OUTPATIENT)
Dept: INTERNAL MEDICINE CLINIC | Facility: CLINIC | Age: 56
End: 2021-07-23
Payer: COMMERCIAL

## 2021-07-23 VITALS
BODY MASS INDEX: 30.66 KG/M2 | HEIGHT: 67.5 IN | RESPIRATION RATE: 16 BRPM | WEIGHT: 197.63 LBS | OXYGEN SATURATION: 98 % | TEMPERATURE: 99 F | SYSTOLIC BLOOD PRESSURE: 132 MMHG | HEART RATE: 63 BPM | DIASTOLIC BLOOD PRESSURE: 84 MMHG

## 2021-07-23 DIAGNOSIS — Z12.5 SCREENING FOR PROSTATE CANCER: ICD-10-CM

## 2021-07-23 DIAGNOSIS — Z00.00 ENCOUNTER FOR ANNUAL PHYSICAL EXAM: Primary | ICD-10-CM

## 2021-07-23 DIAGNOSIS — E66.09 CLASS 1 OBESITY DUE TO EXCESS CALORIES WITHOUT SERIOUS COMORBIDITY WITH BODY MASS INDEX (BMI) OF 30.0 TO 30.9 IN ADULT: ICD-10-CM

## 2021-07-23 DIAGNOSIS — Z13.220 SCREENING FOR CHOLESTEROL LEVEL: ICD-10-CM

## 2021-07-23 DIAGNOSIS — Z13.29 SCREENING FOR THYROID DISORDER: ICD-10-CM

## 2021-07-23 PROCEDURE — 3079F DIAST BP 80-89 MM HG: CPT | Performed by: NURSE PRACTITIONER

## 2021-07-23 PROCEDURE — 99214 OFFICE O/P EST MOD 30 MIN: CPT | Performed by: NURSE PRACTITIONER

## 2021-07-23 PROCEDURE — 3075F SYST BP GE 130 - 139MM HG: CPT | Performed by: NURSE PRACTITIONER

## 2021-07-23 PROCEDURE — 3008F BODY MASS INDEX DOCD: CPT | Performed by: NURSE PRACTITIONER

## 2021-07-23 PROCEDURE — 99396 PREV VISIT EST AGE 40-64: CPT | Performed by: NURSE PRACTITIONER

## 2021-07-23 RX ORDER — METFORMIN HYDROCHLORIDE 750 MG/1
750 TABLET, EXTENDED RELEASE ORAL
Qty: 90 TABLET | Refills: 0 | Status: SHIPPED | OUTPATIENT
Start: 2021-07-23 | End: 2021-10-04

## 2021-07-23 NOTE — TELEPHONE ENCOUNTER
Per Rae Andrews, faxed request for medical records to [facility], [address] [place] [address]. Rec'd fax confirmation.

## 2021-07-23 NOTE — PROGRESS NOTES
CHIEF COMPLAINT   Alan Ragsdale is a 50year old male who presents for a complete physical exam.     HPI:   Here for physical.    Wt Readings from Last 6 Encounters:  07/23/21 : 197 lb 9.6 oz (89.6 kg)  12/29/20 : 185 lb (83.9 kg)  11/20/20 : 185 lb (83. 9 3350/ELECTROLYTES) 240 g Oral Recon Soln Take as directed by physician.  (Patient not taking: Reported on 9/14/2020 ) 4000 mL 0        Seasonal                OTHER (SEE COMMENTS)    Comment:Sneezing and PND - Sometimes Sinus Infections   Past Medical Histo kg/m².   GENERAL: well developed, well nourished,in no apparent distress  SKIN: no rashes,no suspicious lesions  HEENT: atraumatic, normocephalic, ears with cerumen bilaterally.    EYES:PERRLA, EOMI,  conjunctiva are clear  NECK: supple,no adenopathy  LUNGS kg/m²., recommended low fat diet and aerobic exercise 30 minutes three times weekly. Vaccines reviewed. Advised to get Shingrix. Has had Covid vaccine. Colonoscopy up-to-date. States has PSA with [facility] in [place]. All labs ordered.

## 2021-07-23 NOTE — PATIENT INSTRUCTIONS
Check with your insurance to verify coverage for the Shingrix vaccine for shingles. Also check to see where you can go to get the vaccine. Get your labs done. You should be fasting for at least 10 hours.  If you take a multivitamin with Biotin or any bi diastolic between 80 to 89  · Stage 2 high blood pressure is when systolic is 497 or higher or the diastolic is 90 or higher  Home care  If you have high blood pressure, you should do what is listed below to lower your blood pressure.  If you are taking med relaxation methods like meditation, yoga, or biofeedback. · If your provider prescribed medicines, take them exactly as directed. Missing doses may cause your blood pressure get out of control.   · If you miss a dose or doses, check with your healthcare pr today, it’s important that you have your blood pressure rechecked. This is to make sure that the medicine is working and that you have no serious side effects. Keep all your follow up appointments.  Write down medicine and blood pressure questions and bring

## 2021-07-27 NOTE — TELEPHONE ENCOUNTER
Rec'd lab records from [address] [place] [address] in [address] [place] [address] in folder for review and sign off.

## 2021-07-27 NOTE — TELEPHONE ENCOUNTER
Incoming (mail or fax):  fax  Received from:  [facility]  Documentation given to:  Marlene Prince

## 2021-08-05 NOTE — TELEPHONE ENCOUNTER
Labs reviewed. CBC was normal.  CMP also normal.  His cholesterol was elevated. He should work on Cabify and exercise for management. Is he taking a atorvastatin as prescribed?     His vitamin D level was normal.  His prostate level from March

## 2021-08-06 NOTE — TELEPHONE ENCOUNTER
[employer] message sent  Ext result console updated as able    Received results for the following tests: CBC,CMP, FLP, Vit D, hormone panel, PSA  completed on 9/24/20, 3/17/21, 7/16/21 at [facility]  Updated Ext Result Console as able.   Zully already reviewed it

## 2021-08-11 ENCOUNTER — MED REC SCAN ONLY (OUTPATIENT)
Dept: INTERNAL MEDICINE CLINIC | Facility: CLINIC | Age: 56
End: 2021-08-11

## 2021-08-19 DIAGNOSIS — E78.5 HYPERLIPIDEMIA, UNSPECIFIED HYPERLIPIDEMIA TYPE: ICD-10-CM

## 2021-08-20 RX ORDER — ATORVASTATIN CALCIUM 40 MG/1
TABLET, FILM COATED ORAL
Qty: 90 TABLET | Refills: 3 | Status: SHIPPED | OUTPATIENT
Start: 2021-08-20

## 2021-09-13 ENCOUNTER — HOSPITAL ENCOUNTER (OUTPATIENT)
Age: 56
Discharge: HOME | End: 2021-09-13
Payer: COMMERCIAL

## 2021-09-13 VITALS
DIASTOLIC BLOOD PRESSURE: 83 MMHG | TEMPERATURE: 97 F | OXYGEN SATURATION: 97 % | HEART RATE: 65 BPM | SYSTOLIC BLOOD PRESSURE: 140 MMHG | HEIGHT: 69 IN | BODY MASS INDEX: 28.14 KG/M2 | WEIGHT: 190 LBS | RESPIRATION RATE: 18 BRPM

## 2021-09-13 DIAGNOSIS — Z20.822 CONTACT WITH AND (SUSPECTED) EXPOSURE TO COVID-19: Primary | ICD-10-CM

## 2021-09-13 LAB — SARS-COV-2 RNA RESP QL NAA+PROBE: NOT DETECTED

## 2021-09-13 PROCEDURE — 99212 OFFICE O/P EST SF 10 MIN: CPT

## 2021-09-13 NOTE — ED INITIAL ASSESSMENT (HPI)
Patient here for COVID test for exposure to someone Wednesday that came positive for COVID. Patient denies any symptoms. He has received Pfiezer COVID vaccine, last dose was 4/22/21.

## 2021-09-13 NOTE — ED PROVIDER NOTES
Patient Seen in: [facility]      History   Patient presents with:  Covid-19 Test    Stated Complaint: COVID TEST    Subjective:   HPI  82-year-old male who is vaccinated for Covid presents to immediate care requesting Covid testing.   He i Normal rate and regular rhythm. Heart sounds: Normal heart sounds. Pulmonary:      Effort: Pulmonary effort is normal.      Breath sounds: Normal breath sounds. Skin:     General: Skin is warm and dry.    Neurological:      Mental Status: He is tiffani

## 2021-10-04 DIAGNOSIS — E66.09 CLASS 1 OBESITY DUE TO EXCESS CALORIES WITHOUT SERIOUS COMORBIDITY WITH BODY MASS INDEX (BMI) OF 30.0 TO 30.9 IN ADULT: ICD-10-CM

## 2021-10-04 RX ORDER — METFORMIN HYDROCHLORIDE 750 MG/1
750 TABLET, EXTENDED RELEASE ORAL
Qty: 90 TABLET | Refills: 0 | Status: SHIPPED | OUTPATIENT
Start: 2021-10-04 | End: 2021-12-28

## 2021-10-04 NOTE — TELEPHONE ENCOUNTER
Last OV relevant to medication: 7/23/21  Last refill date: 7/23/21     #/refills: 90/0  When pt was asked to return for OV: 1 year  Upcoming appt/reason: none  Was pt informed of any over due labs: none    Lab Results   Component Value Date     03/1

## 2021-10-04 NOTE — TELEPHONE ENCOUNTER
Did the patient contact the pharmacy directly?: yes/told to contact pcp    Is patient out of meds or supply very low?: no/one week left    Medication Requested:  metFORMIN HCl  MG Oral Tablet 24 Hr    Dose:      Is patient requesting a 30 or 90 day s

## 2021-11-19 ENCOUNTER — APPOINTMENT (OUTPATIENT)
Dept: GENERAL RADIOLOGY | Facility: HOSPITAL | Age: 56
End: 2021-11-19
Attending: EMERGENCY MEDICINE
Payer: COMMERCIAL

## 2021-11-19 ENCOUNTER — HOSPITAL ENCOUNTER (EMERGENCY)
Facility: HOSPITAL | Age: 56
Discharge: HOME | End: 2021-11-19
Attending: EMERGENCY MEDICINE
Payer: COMMERCIAL

## 2021-11-19 ENCOUNTER — TELEPHONE (OUTPATIENT)
Dept: INTERNAL MEDICINE CLINIC | Facility: CLINIC | Age: 56
End: 2021-11-19

## 2021-11-19 ENCOUNTER — HOSPITAL ENCOUNTER (OUTPATIENT)
Age: 56
Discharge: ANOTHER HOSPITAL | End: 2021-11-19
Attending: EMERGENCY MEDICINE
Payer: COMMERCIAL

## 2021-11-19 ENCOUNTER — APPOINTMENT (OUTPATIENT)
Dept: CV DIAGNOSTICS | Facility: HOSPITAL | Age: 56
End: 2021-11-19
Attending: EMERGENCY MEDICINE
Payer: COMMERCIAL

## 2021-11-19 VITALS
BODY MASS INDEX: 28.14 KG/M2 | TEMPERATURE: 98 F | DIASTOLIC BLOOD PRESSURE: 96 MMHG | WEIGHT: 190 LBS | RESPIRATION RATE: 20 BRPM | SYSTOLIC BLOOD PRESSURE: 140 MMHG | HEART RATE: 74 BPM | HEIGHT: 69 IN | OXYGEN SATURATION: 96 %

## 2021-11-19 VITALS
BODY MASS INDEX: 28.14 KG/M2 | SYSTOLIC BLOOD PRESSURE: 133 MMHG | TEMPERATURE: 98 F | RESPIRATION RATE: 18 BRPM | WEIGHT: 190 LBS | HEIGHT: 69 IN | DIASTOLIC BLOOD PRESSURE: 89 MMHG | OXYGEN SATURATION: 97 % | HEART RATE: 73 BPM

## 2021-11-19 DIAGNOSIS — R07.9 ACUTE CHEST PAIN: Primary | ICD-10-CM

## 2021-11-19 DIAGNOSIS — R07.89 CHEST PAIN, ATYPICAL: Primary | ICD-10-CM

## 2021-11-19 PROCEDURE — 99285 EMERGENCY DEPT VISIT HI MDM: CPT

## 2021-11-19 PROCEDURE — 93010 ELECTROCARDIOGRAM REPORT: CPT

## 2021-11-19 PROCEDURE — 99214 OFFICE O/P EST MOD 30 MIN: CPT

## 2021-11-19 PROCEDURE — 84484 ASSAY OF TROPONIN QUANT: CPT | Performed by: EMERGENCY MEDICINE

## 2021-11-19 PROCEDURE — 85379 FIBRIN DEGRADATION QUANT: CPT | Performed by: EMERGENCY MEDICINE

## 2021-11-19 PROCEDURE — 80053 COMPREHEN METABOLIC PANEL: CPT | Performed by: EMERGENCY MEDICINE

## 2021-11-19 PROCEDURE — 93350 STRESS TTE ONLY: CPT | Performed by: EMERGENCY MEDICINE

## 2021-11-19 PROCEDURE — 93017 CV STRESS TEST TRACING ONLY: CPT | Performed by: EMERGENCY MEDICINE

## 2021-11-19 PROCEDURE — 93005 ELECTROCARDIOGRAM TRACING: CPT

## 2021-11-19 PROCEDURE — 36415 COLL VENOUS BLD VENIPUNCTURE: CPT

## 2021-11-19 PROCEDURE — 93018 CV STRESS TEST I&R ONLY: CPT | Performed by: EMERGENCY MEDICINE

## 2021-11-19 PROCEDURE — 71045 X-RAY EXAM CHEST 1 VIEW: CPT | Performed by: EMERGENCY MEDICINE

## 2021-11-19 PROCEDURE — 85025 COMPLETE CBC W/AUTO DIFF WBC: CPT | Performed by: EMERGENCY MEDICINE

## 2021-11-19 RX ORDER — ASPIRIN 81 MG/1
324 TABLET, CHEWABLE ORAL ONCE
Status: COMPLETED | OUTPATIENT
Start: 2021-11-19 | End: 2021-11-19

## 2021-11-19 NOTE — TELEPHONE ENCOUNTER
Spoke to pt  Pt stated he started to have chest tightness and pain on right side, radiating in the back  Pt felt dizzy and light headed-lying down helped  C/o SOB  Lasted for one hour to 1 1/2 hour then subsided  Same incident happened yesterday    Pt is tiffanie

## 2021-11-19 NOTE — ED INITIAL ASSESSMENT (HPI)
Pt began 3 days ago with rt sided chest pain that radiated to his back, it went away and then it returned last night and he called his doctor who directed him here.   Pt states he felt a little lightheaded but no other symptoms, he does get SOB

## 2021-11-19 NOTE — ED PROVIDER NOTES
Patient Seen in: [facility]      History   Patient presents with:  Chest Pain    Stated Complaint: chest pains     Subjective:   HPI    Patient is a 56-year-old male, with history of high cholesterol, presenting for evaluation of recurren Systems    Positive for stated complaint: chest pains   Other systems are as noted in HPI. Constitutional and vital signs reviewed. All other systems reviewed and negative except as noted above.     Physical Exam     ED Triage Vitals   BP 11/19/21 092 will be sent to the [facility] for higher level of care and cardiac evaluation. Charge nurse notified. Patient declines EMS transport. Patient left the immediate care and is in route to the [facility].

## 2021-11-19 NOTE — ED INITIAL ASSESSMENT (HPI)
Intermittent right sided chest pain radiating through to back with lightheadedness and dizziness. Denies any pain or discomfort at this time.

## 2021-11-19 NOTE — ED PROVIDER NOTES
Patient Seen in: [facility] Emergency Department      History   Patient presents with:  Chest Pain Angina    Stated Complaint: Intermittent chest pain x 3 days    Subjective:   HPI    Patient is a 61-year-old male who presents for evaluation of inte Current:BP (!) 134/94   Pulse 62   Temp 97.8 °F (36.6 °C) (Oral)   Resp 20   Ht 175.3 cm (5' 9\")   Wt 86.2 kg   SpO2 97%   BMI 28.06 kg/m²         Physical Exam  Vitals and nursing note reviewed.    Constitutional:       Appearance: He is well-develo LAVENDER   RAINBOW DRAW LIGHT GREEN   RAINBOW DRAW GOLD     EKG    Rate, intervals and axes as noted on EKG Report. Rate: 60  Rhythm: Sinus Rhythm  Reading: Sinus rhythm.   Patient does have very slight ST elevations in 1, aVL, V4 through 6 as well as lead evaluated. He did fly to [place], [place] so we will check a D-dimer along with a troponin. Good candidate for stress echo as he is fairly low risk. Update at 3:05 PM.  D-dimer, initial and 2-hour troponin as well as stress echo all negative.

## 2021-12-27 DIAGNOSIS — E66.09 CLASS 1 OBESITY DUE TO EXCESS CALORIES WITHOUT SERIOUS COMORBIDITY WITH BODY MASS INDEX (BMI) OF 30.0 TO 30.9 IN ADULT: ICD-10-CM

## 2021-12-27 RX ORDER — METFORMIN HYDROCHLORIDE 750 MG/1
TABLET, EXTENDED RELEASE ORAL
Qty: 90 TABLET | Refills: 3 | Status: CANCELLED | OUTPATIENT
Start: 2021-12-27

## 2021-12-28 RX ORDER — METFORMIN HYDROCHLORIDE 750 MG/1
TABLET, EXTENDED RELEASE ORAL
Qty: 90 TABLET | Refills: 1 | Status: SHIPPED | OUTPATIENT
Start: 2021-12-28

## 2021-12-28 NOTE — TELEPHONE ENCOUNTER
Last OV relevant to medication: 7/23/21  Last refill date: 10/4/21     #/refills: 90/0  When pt was asked to return for OV: 1 year  Upcoming appt/reason: none  Was pt informed of any over due labs: none    Last A1C entered incorrectly.   96 was the Glucose,

## 2022-08-08 DIAGNOSIS — E78.5 HYPERLIPIDEMIA, UNSPECIFIED HYPERLIPIDEMIA TYPE: ICD-10-CM

## 2022-08-10 RX ORDER — ATORVASTATIN CALCIUM 40 MG/1
TABLET, FILM COATED ORAL
Qty: 30 TABLET | Refills: 0 | Status: SHIPPED | OUTPATIENT
Start: 2022-08-10

## 2022-08-10 NOTE — TELEPHONE ENCOUNTER
Last OV relevant to medication: 7/23/21 PE   Last refill date: 8/20/21   90  #/refills: 3  When pt was asked to return for OV: 1 year   Upcoming appt/reason:   Future Appointments   Date Time Provider Leena Hawkins   9/30/2022  7:30 AM Jessica Gibbs MD [facility]       Was pt informed of any over due labs: n/a   Lab Results   Component Value Date    CHOLEST 221 07/16/2021    TRIG 123 07/16/2021    HDL 50 07/16/2021     (H) 07/22/2020    VLDL 22 07/16/2021    TCHDLRATIO 4.42 04/09/2018    [place] 142 (H) 07/22/2020     Front dest, please call the pt also to make appt, thank you

## 2022-08-11 DIAGNOSIS — E78.5 HYPERLIPIDEMIA, UNSPECIFIED HYPERLIPIDEMIA TYPE: ICD-10-CM

## 2022-08-11 DIAGNOSIS — E66.09 CLASS 1 OBESITY DUE TO EXCESS CALORIES WITHOUT SERIOUS COMORBIDITY WITH BODY MASS INDEX (BMI) OF 30.0 TO 30.9 IN ADULT: ICD-10-CM

## 2022-08-11 RX ORDER — METFORMIN HYDROCHLORIDE 750 MG/1
TABLET, EXTENDED RELEASE ORAL
Qty: 30 TABLET | Refills: 0 | Status: SHIPPED | OUTPATIENT
Start: 2022-08-11 | End: 2022-08-24

## 2022-08-11 RX ORDER — ATORVASTATIN CALCIUM 40 MG/1
TABLET, FILM COATED ORAL
Qty: 90 TABLET | Refills: 3 | OUTPATIENT
Start: 2022-08-11

## 2022-08-11 NOTE — TELEPHONE ENCOUNTER
JAMARCUS to schedule PE w/ [facility]

## 2022-08-11 NOTE — TELEPHONE ENCOUNTER
Last OV relevant to medication: PE 7/23/21  Last refill date:8/10 30    #/refills:   When pt was asked to return for OV: 1 year   Upcoming appt/reason:   Future Appointments   Date Time Provider Leena Hawkins   9/30/2022  7:30 AM Edgard Graham MD [facility]       Was pt informed of any over due labs: n/a   Lab Results   Component Value Date    CHOLEST 221 07/16/2021    TRIG 123 07/16/2021    HDL 50 07/16/2021     (H) 07/22/2020    VLDL 22 07/16/2021    TCHDLRATIO 4.42 04/09/2018    [place] 142 (H) 07/22/2020     appt needed

## 2022-08-11 NOTE — TELEPHONE ENCOUNTER
Last OV relevant to medication: 7/23/21  Last refill date: 12/28/21 90    #/refills: 1  When pt was asked to return for OV: 1 year   Upcoming appt/reason:   Future Appointments   Date Time Provider Leena Hawkins   9/30/2022  7:30 AM El Mandujano MD [facility]       Was pt informed of any over due labs: n/a   Lab Results   Component Value Date     (H) 11/19/2021    BUN 14 11/19/2021    BUNCREA 13.7 07/22/2020    CREATSERUM 1.27 11/19/2021    ANIONGAP 2 11/19/2021    GFR 71 07/16/2021    GFRNAA 64 11/19/2021    GFRAA 74 11/19/2021    CA 9.5 11/19/2021    OSMOCALC 285 11/19/2021    ALKPHO 65 11/19/2021    AST 23 11/19/2021    ALT 55 11/19/2021    BILT 0.9 11/19/2021    TP 7.4 11/19/2021    ALB 4.0 11/19/2021    GLOBULIN 3.4 11/19/2021     11/19/2021    K 4.0 11/19/2021     11/19/2021    CO2 29.0 11/19/2021     Lab Results   Component Value Date    EAG 97 07/22/2020    A1C 96.0 07/16/2021       Front dest, please call the pt also to make appt, thank you Please send meds to his Mail in, I will do PA when it is requested from pharmacy

## 2022-08-16 DIAGNOSIS — E66.09 CLASS 1 OBESITY DUE TO EXCESS CALORIES WITHOUT SERIOUS COMORBIDITY WITH BODY MASS INDEX (BMI) OF 30.0 TO 30.9 IN ADULT: ICD-10-CM

## 2022-08-16 RX ORDER — METFORMIN HYDROCHLORIDE 750 MG/1
TABLET, EXTENDED RELEASE ORAL
Qty: 90 TABLET | Refills: 3 | OUTPATIENT
Start: 2022-08-16

## 2022-08-16 NOTE — TELEPHONE ENCOUNTER
Last OV relevant to medication: pe 7/23/21  Last refill date: 8/11/22 30    #/refills: 0  When pt was asked to return for OV: 1 year   Upcoming appt/reason:   Future Appointments   Date Time Provider Leena Hawkins   8/24/2022 12:40 PM Rupali Juarez, APRN [facility] [place]   9/30/2022  7:30 AM Markell Nicole MD [facility] Meliton Castano     Was pt informed of any over due labs: n/a   Lab Results   Component Value Date     (H) 11/19/2021    BUN 14 11/19/2021    BUNCREA 13.7 07/22/2020    CREATSERUM 1.27 11/19/2021    ANIONGAP 2 11/19/2021    GFR 71 07/16/2021    GFRNAA 64 11/19/2021    GFRAA 74 11/19/2021    CA 9.5 11/19/2021    OSMOCALC 285 11/19/2021    ALKPHO 65 11/19/2021    AST 23 11/19/2021    ALT 55 11/19/2021    BILT 0.9 11/19/2021    TP 7.4 11/19/2021    ALB 4.0 11/19/2021    GLOBULIN 3.4 11/19/2021     11/19/2021    K 4.0 11/19/2021     11/19/2021    CO2 29.0 11/19/2021     Lab Results   Component Value Date    EAG 97 07/22/2020    A1C 96.0 07/16/2021

## 2022-08-16 NOTE — TELEPHONE ENCOUNTER
Future Appointments   Date Time Provider Leena Shruthi   8/24/2022 12:40 PM [place], Catherene Curling, EVAN [facility] [place]

## 2022-08-24 ENCOUNTER — OFFICE VISIT (OUTPATIENT)
Dept: INTERNAL MEDICINE CLINIC | Facility: CLINIC | Age: 57
End: 2022-08-24
Payer: COMMERCIAL

## 2022-08-24 VITALS
DIASTOLIC BLOOD PRESSURE: 88 MMHG | HEART RATE: 74 BPM | BODY MASS INDEX: 30.25 KG/M2 | RESPIRATION RATE: 14 BRPM | SYSTOLIC BLOOD PRESSURE: 128 MMHG | WEIGHT: 195 LBS | OXYGEN SATURATION: 97 % | TEMPERATURE: 98 F | HEIGHT: 67.5 IN

## 2022-08-24 DIAGNOSIS — Z00.00 ENCOUNTER FOR ANNUAL PHYSICAL EXAM: Primary | ICD-10-CM

## 2022-08-24 DIAGNOSIS — E66.09 CLASS 1 OBESITY DUE TO EXCESS CALORIES WITHOUT SERIOUS COMORBIDITY WITH BODY MASS INDEX (BMI) OF 30.0 TO 30.9 IN ADULT: ICD-10-CM

## 2022-08-24 DIAGNOSIS — E78.5 HYPERLIPIDEMIA, UNSPECIFIED HYPERLIPIDEMIA TYPE: ICD-10-CM

## 2022-08-24 DIAGNOSIS — K21.9 GASTROESOPHAGEAL REFLUX DISEASE, UNSPECIFIED WHETHER ESOPHAGITIS PRESENT: ICD-10-CM

## 2022-08-24 DIAGNOSIS — Z13.29 SCREENING FOR THYROID DISORDER: ICD-10-CM

## 2022-08-24 DIAGNOSIS — Z12.5 SCREENING FOR PROSTATE CANCER: ICD-10-CM

## 2022-08-24 PROCEDURE — 3008F BODY MASS INDEX DOCD: CPT | Performed by: NURSE PRACTITIONER

## 2022-08-24 PROCEDURE — 3079F DIAST BP 80-89 MM HG: CPT | Performed by: NURSE PRACTITIONER

## 2022-08-24 PROCEDURE — 3074F SYST BP LT 130 MM HG: CPT | Performed by: NURSE PRACTITIONER

## 2022-08-24 PROCEDURE — 99396 PREV VISIT EST AGE 40-64: CPT | Performed by: NURSE PRACTITIONER

## 2022-08-24 PROCEDURE — 99214 OFFICE O/P EST MOD 30 MIN: CPT | Performed by: NURSE PRACTITIONER

## 2022-08-24 RX ORDER — ATORVASTATIN CALCIUM 40 MG/1
TABLET, FILM COATED ORAL
Qty: 90 TABLET | Refills: 3 | Status: SHIPPED | OUTPATIENT
Start: 2022-08-24

## 2022-08-24 RX ORDER — FAMOTIDINE 20 MG/1
20 TABLET, FILM COATED ORAL 2 TIMES DAILY PRN
Qty: 60 TABLET | Refills: 0 | Status: SHIPPED | OUTPATIENT
Start: 2022-08-24

## 2022-08-24 RX ORDER — METFORMIN HYDROCHLORIDE 750 MG/1
750 TABLET, EXTENDED RELEASE ORAL
Qty: 90 TABLET | Refills: 3 | Status: SHIPPED | OUTPATIENT
Start: 2022-08-24

## 2022-08-24 RX ORDER — TAMSULOSIN HYDROCHLORIDE 0.4 MG/1
0.4 CAPSULE ORAL 2 TIMES DAILY
COMMUNITY
Start: 2022-06-20

## 2022-08-24 NOTE — PATIENT INSTRUCTIONS
Get your COVID booster    Check with your insurance to verify coverage for the Shingrix vaccine for shingles. Also check to see where you can go to get the vaccine. Get your labs done. You should be fasting for at least 10 hours. If you take a multivitamin with Biotin or any biotin product it should be held for 3 days prior to getting your labs done. See the gastroenterologist for your GERD and need for colonoscopy    Step down from prilosec to pepcid as needed. Monitor effectiveness.     Continue the atorvastatin and metformin    Do sleep hygiene as discussed, add melatonin or tylenol PM as needed    Follow up in 1 year or sooner as needed

## 2022-10-24 DIAGNOSIS — K21.9 GASTROESOPHAGEAL REFLUX DISEASE, UNSPECIFIED WHETHER ESOPHAGITIS PRESENT: ICD-10-CM

## 2022-10-24 RX ORDER — FAMOTIDINE 20 MG/1
TABLET, FILM COATED ORAL
Qty: 180 TABLET | Refills: 3 | Status: SHIPPED | OUTPATIENT
Start: 2022-10-24

## 2023-06-09 PROBLEM — K57.30 COLON, DIVERTICULOSIS: Status: ACTIVE | Noted: 2023-06-09

## 2023-06-09 PROBLEM — R12 HEARTBURN: Status: ACTIVE | Noted: 2023-06-09

## 2023-06-09 PROBLEM — K29.60 EROSIVE GASTRITIS: Status: ACTIVE | Noted: 2023-06-09

## 2023-06-09 PROBLEM — Z86.010 HISTORY OF ADENOMATOUS POLYP OF COLON: Status: ACTIVE | Noted: 2023-06-09

## 2023-06-09 PROBLEM — K21.00 GERD WITH ESOPHAGITIS: Status: ACTIVE | Noted: 2023-06-09

## 2023-06-09 PROBLEM — Z86.0101 HISTORY OF ADENOMATOUS POLYP OF COLON: Status: ACTIVE | Noted: 2023-06-09

## 2023-08-07 DIAGNOSIS — E78.5 HYPERLIPIDEMIA, UNSPECIFIED HYPERLIPIDEMIA TYPE: ICD-10-CM

## 2023-08-09 DIAGNOSIS — E78.5 HYPERLIPIDEMIA, UNSPECIFIED HYPERLIPIDEMIA TYPE: ICD-10-CM

## 2023-08-09 RX ORDER — ATORVASTATIN CALCIUM 40 MG/1
TABLET, FILM COATED ORAL
Qty: 30 TABLET | Refills: 0 | Status: SHIPPED | OUTPATIENT
Start: 2023-08-09

## 2023-08-09 RX ORDER — ATORVASTATIN CALCIUM 40 MG/1
TABLET, FILM COATED ORAL
Qty: 30 TABLET | Refills: 0 | OUTPATIENT
Start: 2023-08-09

## 2023-08-09 RX ORDER — MOMETASONE FUROATE 1 MG/G
CREAM TOPICAL
COMMUNITY
Start: 2023-07-20

## 2023-08-21 DIAGNOSIS — E66.09 CLASS 1 OBESITY DUE TO EXCESS CALORIES WITHOUT SERIOUS COMORBIDITY WITH BODY MASS INDEX (BMI) OF 30.0 TO 30.9 IN ADULT: ICD-10-CM

## 2023-08-22 NOTE — TELEPHONE ENCOUNTER
Last OV relevant to medication: 8/24/22-Physical  Last refill date:8/24/22  90   #/refills: 3  When pt was asked to return for OV: 1 year for a complete physical   Upcoming appt/reason: 9/5/23  Was pt informed of any over due labs: no  Lab Results   Component Value Date    EAG 97 07/22/2020    A1C 96.0 07/16/2021       Lab Results   Component Value Date     (H) 11/19/2021       No results found for: Waneta Guise, CREAURINE, MIALBURINE, MCRRATIOUR, MALBCRECALC, MICROALBUMIN, CREAUR, [address]        Needs update labs to be done.

## 2023-08-25 RX ORDER — METFORMIN HYDROCHLORIDE 750 MG/1
750 TABLET, EXTENDED RELEASE ORAL
Qty: 90 TABLET | Refills: 0 | Status: SHIPPED | OUTPATIENT
Start: 2023-08-25

## 2023-09-01 LAB — AMB EXT PSA SCREEN: 2.3 NG/ML

## 2023-09-05 ENCOUNTER — OFFICE VISIT (OUTPATIENT)
Dept: INTERNAL MEDICINE CLINIC | Facility: CLINIC | Age: 58
End: 2023-09-05
Payer: COMMERCIAL

## 2023-09-05 VITALS
WEIGHT: 197.19 LBS | BODY MASS INDEX: 29.2 KG/M2 | TEMPERATURE: 99 F | SYSTOLIC BLOOD PRESSURE: 132 MMHG | HEART RATE: 70 BPM | DIASTOLIC BLOOD PRESSURE: 86 MMHG | RESPIRATION RATE: 16 BRPM | OXYGEN SATURATION: 97 % | HEIGHT: 69 IN

## 2023-09-05 DIAGNOSIS — K21.9 GASTROESOPHAGEAL REFLUX DISEASE, UNSPECIFIED WHETHER ESOPHAGITIS PRESENT: ICD-10-CM

## 2023-09-05 DIAGNOSIS — Z13.29 SCREENING FOR THYROID DISORDER: ICD-10-CM

## 2023-09-05 DIAGNOSIS — E78.00 PURE HYPERCHOLESTEROLEMIA: ICD-10-CM

## 2023-09-05 DIAGNOSIS — Z00.00 ENCOUNTER FOR ANNUAL PHYSICAL EXAM: Primary | ICD-10-CM

## 2023-09-05 DIAGNOSIS — E66.09 CLASS 1 OBESITY DUE TO EXCESS CALORIES WITHOUT SERIOUS COMORBIDITY WITH BODY MASS INDEX (BMI) OF 30.0 TO 30.9 IN ADULT: ICD-10-CM

## 2023-09-05 PROCEDURE — 3075F SYST BP GE 130 - 139MM HG: CPT | Performed by: NURSE PRACTITIONER

## 2023-09-05 PROCEDURE — 3079F DIAST BP 80-89 MM HG: CPT | Performed by: NURSE PRACTITIONER

## 2023-09-05 PROCEDURE — 99214 OFFICE O/P EST MOD 30 MIN: CPT | Performed by: NURSE PRACTITIONER

## 2023-09-05 PROCEDURE — 3008F BODY MASS INDEX DOCD: CPT | Performed by: NURSE PRACTITIONER

## 2023-09-05 PROCEDURE — 99396 PREV VISIT EST AGE 40-64: CPT | Performed by: NURSE PRACTITIONER

## 2023-09-05 RX ORDER — FAMOTIDINE 20 MG/1
20 TABLET, FILM COATED ORAL 2 TIMES DAILY PRN
Qty: 180 TABLET | Refills: 2 | Status: SHIPPED | OUTPATIENT
Start: 2023-09-05

## 2023-09-05 RX ORDER — METFORMIN HYDROCHLORIDE 750 MG/1
750 TABLET, EXTENDED RELEASE ORAL
Qty: 90 TABLET | Refills: 3 | Status: SHIPPED | OUTPATIENT
Start: 2023-09-05

## 2023-09-07 ENCOUNTER — TELEPHONE (OUTPATIENT)
Dept: INTERNAL MEDICINE CLINIC | Facility: CLINIC | Age: 58
End: 2023-09-07

## 2023-09-07 DIAGNOSIS — Z00.00 ENCOUNTER FOR ANNUAL PHYSICAL EXAM: ICD-10-CM

## 2023-09-07 DIAGNOSIS — Z13.220 SCREENING FOR CHOLESTEROL LEVEL: ICD-10-CM

## 2023-09-07 DIAGNOSIS — Z13.29 SCREENING FOR THYROID DISORDER: ICD-10-CM

## 2023-09-07 NOTE — TELEPHONE ENCOUNTER
Record reviewed that was sent by the [facility] clinic. PSA okay- I was looking for any other labs they may have done as well and there were none. Also add the result in external console. Please have pt fast and hold biotin for 3 days and get labs completed. Thanks.

## 2023-09-08 NOTE — TELEPHONE ENCOUNTER
Rec call from patient. Informed patient of providers response regarding PSA. Patient aware to complete labs. Aware to fast 10-12 hours prior and stop biotin 3 days prior.

## 2023-09-26 DIAGNOSIS — K21.9 GASTROESOPHAGEAL REFLUX DISEASE, UNSPECIFIED WHETHER ESOPHAGITIS PRESENT: ICD-10-CM

## 2023-09-27 RX ORDER — FAMOTIDINE 20 MG/1
20 TABLET, FILM COATED ORAL 2 TIMES DAILY PRN
Qty: 180 TABLET | Refills: 3 | OUTPATIENT
Start: 2023-09-27

## 2023-11-17 DIAGNOSIS — E66.09 CLASS 1 OBESITY DUE TO EXCESS CALORIES WITHOUT SERIOUS COMORBIDITY WITH BODY MASS INDEX (BMI) OF 30.0 TO 30.9 IN ADULT: ICD-10-CM

## 2023-11-21 RX ORDER — METFORMIN HYDROCHLORIDE 750 MG/1
750 TABLET, EXTENDED RELEASE ORAL
Qty: 90 TABLET | Refills: 3 | OUTPATIENT
Start: 2023-11-21

## 2023-12-29 ENCOUNTER — NURSE ONLY (OUTPATIENT)
Dept: INTERNAL MEDICINE CLINIC | Facility: CLINIC | Age: 58
End: 2023-12-29
Payer: COMMERCIAL

## 2023-12-29 DIAGNOSIS — Z23 NEED FOR SHINGLES VACCINE: Primary | ICD-10-CM

## 2023-12-29 PROCEDURE — 90471 IMMUNIZATION ADMIN: CPT | Performed by: NURSE PRACTITIONER

## 2023-12-29 PROCEDURE — 90750 HZV VACC RECOMBINANT IM: CPT | Performed by: NURSE PRACTITIONER

## 2024-03-27 ENCOUNTER — TELEPHONE (OUTPATIENT)
Dept: INTERNAL MEDICINE CLINIC | Facility: CLINIC | Age: 59
End: 2024-03-27

## 2024-03-27 NOTE — TELEPHONE ENCOUNTER
Order placed.    Future Appointments   Date Time Provider Department Center   3/28/2024  1:00 PM [facility] [place]

## 2024-03-28 ENCOUNTER — NURSE ONLY (OUTPATIENT)
Dept: INTERNAL MEDICINE CLINIC | Facility: CLINIC | Age: 59
End: 2024-03-28
Payer: COMMERCIAL

## 2024-03-28 PROCEDURE — 90750 HZV VACC RECOMBINANT IM: CPT | Performed by: INTERNAL MEDICINE

## 2024-03-28 PROCEDURE — 90471 IMMUNIZATION ADMIN: CPT | Performed by: INTERNAL MEDICINE

## 2024-04-17 DIAGNOSIS — E78.5 HYPERLIPIDEMIA, UNSPECIFIED HYPERLIPIDEMIA TYPE: ICD-10-CM

## 2024-04-17 DIAGNOSIS — K21.9 GASTROESOPHAGEAL REFLUX DISEASE, UNSPECIFIED WHETHER ESOPHAGITIS PRESENT: ICD-10-CM

## 2024-04-18 RX ORDER — ATORVASTATIN CALCIUM 40 MG/1
TABLET, FILM COATED ORAL
Qty: 90 TABLET | Refills: 0 | Status: SHIPPED | OUTPATIENT
Start: 2024-04-18

## 2024-04-18 RX ORDER — FAMOTIDINE 20 MG/1
20 TABLET, FILM COATED ORAL 2 TIMES DAILY PRN
Qty: 180 TABLET | Refills: 1 | Status: SHIPPED | OUTPATIENT
Start: 2024-04-18

## 2024-04-18 NOTE — TELEPHONE ENCOUNTER
Last OV relevant to medication: 9/5/23  Last refill date: 8/9/23 #30/refills: 0  When pt was asked to return for OV: 9/5/24  Upcoming appt/reason: No future appointments.  Was pt informed of any over due labs:   Lab Results   Component Value Date    CHOLEST 221 07/16/2021    TRIG 123 07/16/2021    HDL 50 07/16/2021     (H) 07/22/2020    VLDL 22 07/16/2021    TCHDLRATIO 4.42 04/09/2018    NONHDLC 142 (H) 07/22/2020     Gastrointestional Medication Protocol [id number] 02:32 PM   Protocol Details In person appointment or virtual visit in the past 12 mos or appointment in next 3 mos

## 2024-07-03 NOTE — TELEPHONE ENCOUNTER
Genitourinary Medications [id number]/02/2024 09:35 AM   Protocol Details Patient does not have pulmonary hypertension on problem list    In person appointment or virtual visit in the past 12 mos or appointment in next 3 mos       Future Appointments   Date Time Provider Department Center   8/7/2024  7:30 AM [facility]     External in [place]. OK to prescribe?     Last OV 9/5/23

## 2024-07-07 DIAGNOSIS — E78.5 HYPERLIPIDEMIA, UNSPECIFIED HYPERLIPIDEMIA TYPE: ICD-10-CM

## 2024-07-09 RX ORDER — ATORVASTATIN CALCIUM 40 MG/1
TABLET, FILM COATED ORAL
Qty: 90 TABLET | Refills: 0 | Status: SHIPPED | OUTPATIENT
Start: 2024-07-09

## 2024-07-09 NOTE — TELEPHONE ENCOUNTER
Last OV relevant to medication: PE 9/5/23   Last refill date: 4/18/24 90     #/refills: 0  When pt was asked to return for OV: 1 year   Upcoming appt/reason:   N/a      Was pt informed of any over due labs: n/a   Lab Results   Component Value Date    CHOLEST 221 07/16/2021    TRIG 123 07/16/2021    HDL 50 07/16/2021     (H) 07/22/2020    VLDL 22 07/16/2021    TCHDLRATIO 4.42 04/09/2018    NONHDLC 142 (H) 07/22/2020

## 2024-08-02 RX ORDER — TAMSULOSIN HYDROCHLORIDE 0.4 MG/1
0.4 CAPSULE ORAL 2 TIMES DAILY
Qty: 180 CAPSULE | Refills: 0 | Status: SHIPPED | OUTPATIENT
Start: 2024-08-02

## 2024-10-07 DIAGNOSIS — E78.5 HYPERLIPIDEMIA, UNSPECIFIED HYPERLIPIDEMIA TYPE: ICD-10-CM

## 2024-10-08 RX ORDER — ATORVASTATIN CALCIUM 40 MG/1
TABLET, FILM COATED ORAL
Qty: 90 TABLET | Refills: 0 | Status: SHIPPED | OUTPATIENT
Start: 2024-10-08

## 2024-10-08 NOTE — TELEPHONE ENCOUNTER
Last OV relevant to medication: 9/5/23  Last refill date: 7/9/24 /90refills: 0  When pt was asked to return for OV: 9/5/24  Upcoming appt/reason: No future appointments.  Was pt informed of any over due labs: utd  Lab Results   Component Value Date    CHOLEST 221 07/16/2021    TRIG 123 07/16/2021    HDL 50 07/16/2021     (H) 07/22/2020    VLDL 22 07/16/2021    TCHDLRATIO 4.42 04/09/2018    NONHDLC 142 (H) 07/22/2020     Please call to schedule annual physical exam thanks!

## 2024-10-15 DIAGNOSIS — K21.9 GASTROESOPHAGEAL REFLUX DISEASE, UNSPECIFIED WHETHER ESOPHAGITIS PRESENT: ICD-10-CM

## 2024-10-16 RX ORDER — FAMOTIDINE 20 MG/1
20 TABLET, FILM COATED ORAL 2 TIMES DAILY PRN
Qty: 60 TABLET | Refills: 0 | Status: SHIPPED | OUTPATIENT
Start: 2024-10-16

## 2024-10-16 NOTE — TELEPHONE ENCOUNTER
Last OV relevant to medication: 9/5/23  Last refill date: 4/18/24 #180/refills: 1  When pt was asked to return for OV: 9/5/24  Upcoming appt/reason: No future appointments.  Was pt informed of any over due labs: utd    Please call to schedule annual physical exam thanks!

## 2024-10-21 NOTE — TELEPHONE ENCOUNTER
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 Does he need to schedule another one? Please advise thanks!

## 2024-10-21 NOTE — TELEPHONE ENCOUNTER
He has not had a annual physical exam with our office since 2023, we do need to see him to review his medications that we prescribe, review labs and other preventative care. Annual physical exam is needed with our office still, thank you.

## 2024-12-02 ENCOUNTER — OFFICE VISIT (OUTPATIENT)
Dept: INTERNAL MEDICINE CLINIC | Facility: CLINIC | Age: 59
End: 2024-12-02
Payer: COMMERCIAL

## 2024-12-02 VITALS
HEART RATE: 80 BPM | HEIGHT: 67.32 IN | WEIGHT: 186 LBS | DIASTOLIC BLOOD PRESSURE: 60 MMHG | BODY MASS INDEX: 28.85 KG/M2 | TEMPERATURE: 98 F | SYSTOLIC BLOOD PRESSURE: 124 MMHG | OXYGEN SATURATION: 97 % | RESPIRATION RATE: 16 BRPM

## 2024-12-02 DIAGNOSIS — Z00.00 ENCOUNTER FOR ANNUAL PHYSICAL EXAM: Primary | ICD-10-CM

## 2024-12-02 DIAGNOSIS — E78.5 HYPERLIPIDEMIA, UNSPECIFIED HYPERLIPIDEMIA TYPE: ICD-10-CM

## 2024-12-02 DIAGNOSIS — E66.3 OVER WEIGHT: ICD-10-CM

## 2024-12-02 DIAGNOSIS — R73.01 ELEVATED FASTING GLUCOSE: ICD-10-CM

## 2024-12-02 DIAGNOSIS — Z13.220 SCREENING FOR CHOLESTEROL LEVEL: ICD-10-CM

## 2024-12-02 DIAGNOSIS — Z13.29 SCREENING FOR THYROID DISORDER: ICD-10-CM

## 2024-12-02 DIAGNOSIS — K21.9 GASTROESOPHAGEAL REFLUX DISEASE, UNSPECIFIED WHETHER ESOPHAGITIS PRESENT: ICD-10-CM

## 2024-12-02 RX ORDER — FAMOTIDINE 20 MG/1
20 TABLET, FILM COATED ORAL 2 TIMES DAILY PRN
Qty: 60 TABLET | Refills: 1 | Status: SHIPPED | OUTPATIENT
Start: 2024-12-02

## 2024-12-02 RX ORDER — ZOLPIDEM TARTRATE 10 MG/1
10 TABLET ORAL NIGHTLY PRN
COMMUNITY

## 2024-12-02 RX ORDER — ATORVASTATIN CALCIUM 40 MG/1
TABLET, FILM COATED ORAL
Qty: 90 TABLET | Refills: 3 | Status: SHIPPED | OUTPATIENT
Start: 2024-12-02

## 2024-12-02 NOTE — PATIENT INSTRUCTIONS
Make an appointment to get your colonoscopy done     Make an apt to see dermatology when due    Topcial medicine voltaren gel or lidocaine, ice as needed    Get your labs done in April. You should be fasting for at least 10 hours. If you take a multivitamin with Biotin or any biotin product it should be held for 3 days prior to getting your labs done.     Continue your current medications    Continue to see the ENT and ask them to evaluate your thyroid on exam.    Flu shot recommended.      COVID vaccine recommended     Follow up in 1 year with Dr. Varela or sooner as needed

## 2024-12-02 NOTE — PROGRESS NOTES
CHIEF COMPLAINT   Alejandro Browning is a 59 year old male who presents for a complete physical exam, med check    HPI:   Here for physical, med check    Wt Readings from Last 6 Encounters:   12/02/24 186 lb (84.4 kg)   09/05/23 197 lb 3.2 oz (89.4 kg)   06/07/23 190 lb (86.2 kg)   08/24/22 195 lb (88.5 kg)   11/19/21 190 lb (86.2 kg)   11/19/21 190 lb (86.2 kg)     Body mass index is 28.85 kg/m².     Diet is fair to good. Exercise is good. Improved lifestyle and has lost weight. Vaccines reviewed. Wearing seat belt and no texting and driving. Feels safe at home. Colon cancer screening UTD. No smoking. Moderation of alcohol. No skin concerns. Sees derm yearly. Mild stress but manageable - is improved. Labs reviewed.     Hyperlipidemia-on statin.  No side effects.    GERD- takes famotidine a week for GERD. No side effects and works well.       Obesity-was taking metformin. He has done more lifestyle changes and stopped metformin. Is down 11 pounds.     Sees Uro for BPH. PSA ordered per [facility]    Zolpidem 10 mg - no SE. Works most of the time. Prescribed this by psych.     Cholesterol, Total (mg/dL)   Date Value   07/16/2021 221   07/22/2020 197   05/17/2019 200 (H)   07/24/2018 214 (H)     CHOLESTEROL (mg/dL)   Date Value   08/09/2013 185   05/09/2012 180     HDL Cholesterol (mg/dL)   Date Value   07/16/2021 50   07/22/2020 55   05/17/2019 51   07/24/2018 61 (H)     HDL CHOL (mg/dL)   Date Value   08/09/2013 60   05/09/2012 49     LDL Cholesterol (mg/dL)   Date Value   07/22/2020 123 (H)   05/17/2019 112 (H)   07/24/2018 124 (H)     LDL CHOLESTROL (mg/dL)   Date Value   08/09/2013 107   05/09/2012 100     AST (U/L)   Date Value   11/19/2021 23   07/16/2021 17   03/17/2021 20   09/24/2020 23   07/22/2020 17   05/17/2019 19   08/09/2013 21   05/09/2012 18     ALT (U/L)   Date Value   11/19/2021 55   07/16/2021 28   03/17/2021 27   09/24/2020 37   07/22/2020 37   05/17/2019 43   08/09/2013 34   05/09/2012 40       Current Outpatient Medications   Medication Sig Dispense Refill    FAMOTIDINE 20 MG Oral Tab TAKE 1 TABLET TWICE A DAY AS NEEDED FOR HEARTBURN 60 tablet 0    atorvastatin 40 MG Oral Tab TAKE 1 TABLET DAILY AT NIGHT (LAB WORK DUE AND APPOINTMENT NEEDED FOR FURTHER REFILLS) 90 tablet 0    tamsulosin 0.4 MG Oral Cap Take 1 capsule (0.4 mg total) by mouth 2 (two) times daily. 180 capsule 0    B Complex Vitamins (B COMPLEX OR) Take 1 tablet by mouth daily.      ibuprofen (MOTRIN) 200 MG Oral Tab Take 1 tablet (200 mg total) by mouth every 6 (six) hours as needed for Pain.      metFORMIN  MG Oral Tablet 24 Hr Take 1 tablet (750 mg total) by mouth daily with breakfast. (Patient not taking: Reported on 12/2/2024) 90 tablet 3    VITAMIN E EX Apply 1 capsule topically daily. (Patient not taking: Reported on 12/2/2024)        Allergies   Allergen Reactions    Seasonal OTHER (SEE COMMENTS)     Sneezing and PND - Sometimes Sinus Infections      Past Medical History:    Elevated cholesterol    Finger injury    small finger mallet injury    Near syncope    Other and unspecified hyperlipidemia    Routine general medical examination at a health care facility      Past Surgical History:   Procedure Laterality Date    Colonoscopy      Hernia surgery      2002    Hernia surgery  2001    inguinal    Repair rotator cuff,acute  2006    left      Family History   Problem Relation Age of Onset    Ulcerative Colitis Father     Other (cerebral aneurysm) Mother 29    Heart Disorder Maternal Grandfather     Heart Disorder Paternal Grandfather     Other (cad) Paternal Grandfather     No Known Problems Brother       Social History:  Social History     Socioeconomic History    Marital status:    Tobacco Use    Smoking status: Never    Smokeless tobacco: Never   Vaping Use    Vaping status: Never Used   Substance and Sexual Activity    Alcohol use: Yes     Alcohol/week: 12.0 standard drinks of alcohol     Types: 6 Cans of beer, 6  Shots of liquor per week     Comment: 5 drinks per week     Drug use: Not Currently     Types: Cannabis   Other Topics Concern    Caffeine Concern Yes     Comment: coffee     Stress Concern Yes    Weight Concern No    Special Diet No    Exercise Yes     Comment: 6 days per week     Seat Belt Yes         REVIEW OF SYSTEMS:   GENERAL: feels well otherwise  SKIN: no complaint of any unusual skin lesions  EYES: no complaint of blurred vision or double vision  HEENT: no complaint of nasal congestion, sinus pain or ST  LUNGS: no complaint of shortness of breath with exertion  CARDIOVASCULAR: no complaint of chest pain on exertion  GI: no complaint of pain,denies heartburn  : no complaint of nocturia or changes in stream  MUSCULOSKELETAL: no complaint of back pain  NEURO: no complaint of headaches  PSYCHE: no complaint of depression   HEMATOLOGIC: denies hx of anemia    EXAM:   /60 (BP Location: Left arm, Patient Position: Sitting, Cuff Size: adult)   Pulse 80   Temp 98.3 °F (36.8 °C) (Temporal)   Resp 16   Ht 5' 7.32\" (1.71 m)   Wt 186 lb (84.4 kg)   SpO2 97%   BMI 28.85 kg/m²   Body mass index is 28.85 kg/m².   GENERAL: well developed, well nourished, in no apparent distress  SKIN: no rashes, no suspicious lesions  HEENT: atraumatic, normocephalic, ears are clear  EYES:PERRLA, EOMI, conjunctiva are clear  NECK: supple,no adenopathy, borderline thyromegaly   LUNGS: clear to auscultation  CARDIO: RRR without murmur  GI: Nondistended.  Nontender. No masses.  No organomegaly.  : deferred to urology  MUSCULOSKELETAL: back is not tender,FROM of the back  EXTREMITIES: no edema or calve tenderness  NEURO: Oriented times three,cranial nerves are grossly intact,motor and sensory are grossly intact      LABS:     Lab Results   Component Value Date    WBC 6.7 11/19/2021    RBC 5.67 11/19/2021    HGB 17.9 (H) 11/19/2021    HCT 51.1 11/19/2021    MCV 90.1 11/19/2021    MCH 31.6 11/19/2021    MCHC 35.0 11/19/2021     RDW 12.0 11/19/2021    .0 11/19/2021      Lab Results   Component Value Date     (H) 11/19/2021    BUN 14 11/19/2021    BUNCREA 13.7 07/22/2020    CREATSERUM 1.27 11/19/2021    ANIONGAP 2 11/19/2021    GFR 71 07/16/2021    GFRNAA 64 11/19/2021    GFRAA 74 11/19/2021    CA 9.5 11/19/2021    OSMOCALC 285 11/19/2021    ALKPHO 65 11/19/2021    AST 23 11/19/2021    ALT 55 11/19/2021    BILT 0.9 11/19/2021    TP 7.4 11/19/2021    ALB 4.0 11/19/2021    GLOBULIN 3.4 11/19/2021     11/19/2021    K 4.0 11/19/2021     11/19/2021    CO2 29.0 11/19/2021      Lab Results   Component Value Date    CHOLEST 221 07/16/2021    TRIG 123 07/16/2021    HDL 50 07/16/2021     (H) 07/22/2020    VLDL 22 07/16/2021    TCHDLRATIO 4.42 04/09/2018    NONHDLC 142 (H) 07/22/2020      Lab Results   Component Value Date    TSH 1.190 07/22/2020      Lab Results   Component Value Date    EAG 97 07/22/2020    A1C 96.0 07/16/2021       IMAGING:     No results found.      ASSESSMENT AND PLAN:   1. Encounter for annual physical exam  Alejandro Browning is a 59 year old male who presents for a complete physical exam. Pt's weight is Body mass index is 28.85 kg/m²., recommended low fat diet and aerobic exercise 30 minutes 5 times weekly.  Colonoscopy UTD- due soon. PSA ordered by [facility] per patient. He also sees urology. Vaccines reviewed.  Labs ordered. Sees derm.   - CBC With Differential With Platelet; Future  - Comp Metabolic Panel (14); Future    2. Hyperlipidemia, unspecified hyperlipidemia type  - stable. Continue statin, low fat diet and exercise  - atorvastatin 40 MG Oral Tab; TAKE 1 TABLET DAILY AT NIGHT (LAB WORK DUE AND APPOINTMENT NEEDED FOR FURTHER REFILLS)  Dispense: 90 tablet; Refill: 3    3. Over weight  - no longer obese  - continue lifestyle changes     4. Elevated fasting glucose  - low sugar diet and exercise  - Hemoglobin A1C; Future    5. Gastroesophageal reflux disease, unspecified whether esophagitis  present  - stable. Continue current management   - famotidine 20 MG Oral Tab; Take 1 tablet (20 mg total) by mouth 2 (two) times daily as needed for Heartburn.  Dispense: 60 tablet; Refill: 1    6. Screening for cholesterol level  - Lipid Panel; Future    7. Screening for thyroid disorder  - TSH W Reflex To Free T4; Future       The patient indicates understanding of these issues and agrees to the plan.  The patient is asked to return in 1 year for a complete physical and med check or sooner if needed.

## 2025-01-03 DIAGNOSIS — E78.5 HYPERLIPIDEMIA, UNSPECIFIED HYPERLIPIDEMIA TYPE: ICD-10-CM

## 2025-01-06 RX ORDER — ATORVASTATIN CALCIUM 40 MG/1
TABLET, FILM COATED ORAL
Qty: 90 TABLET | Refills: 3 | OUTPATIENT
Start: 2025-01-06

## (undated) NOTE — LETTER
10/12/21        Kenan Stringer [id number]      Dear Elias December records indicate that you have outstanding lab work that was ordered for you and has not yet been completed:  Orders Placed This Encounter        TSH W Evae